# Patient Record
Sex: MALE | URBAN - METROPOLITAN AREA
[De-identification: names, ages, dates, MRNs, and addresses within clinical notes are randomized per-mention and may not be internally consistent; named-entity substitution may affect disease eponyms.]

---

## 2017-12-11 PROBLEM — E66.01 SEVERE OBESITY (HCC): Status: ACTIVE | Noted: 2017-12-11

## 2017-12-11 PROBLEM — I10 ESSENTIAL HYPERTENSION: Status: ACTIVE | Noted: 2017-12-11

## 2017-12-11 PROBLEM — J30.9 CHRONIC ALLERGIC RHINITIS: Status: ACTIVE | Noted: 2017-12-11

## 2017-12-11 PROBLEM — E78.00 HYPERCHOLESTEROLEMIA: Status: ACTIVE | Noted: 2017-12-11

## 2017-12-11 PROBLEM — M15.9 PRIMARY OSTEOARTHRITIS INVOLVING MULTIPLE JOINTS: Status: ACTIVE | Noted: 2017-12-11

## 2017-12-13 ENCOUNTER — APPOINTMENT (RX ONLY)
Dept: URBAN - METROPOLITAN AREA CLINIC 23 | Facility: CLINIC | Age: 72
Setting detail: DERMATOLOGY
End: 2017-12-13

## 2017-12-13 DIAGNOSIS — D22 MELANOCYTIC NEVI: ICD-10-CM

## 2017-12-13 DIAGNOSIS — L57.0 ACTINIC KERATOSIS: ICD-10-CM

## 2017-12-13 DIAGNOSIS — L30.4 ERYTHEMA INTERTRIGO: ICD-10-CM

## 2017-12-13 DIAGNOSIS — L81.4 OTHER MELANIN HYPERPIGMENTATION: ICD-10-CM

## 2017-12-13 DIAGNOSIS — L82.1 OTHER SEBORRHEIC KERATOSIS: ICD-10-CM

## 2017-12-13 PROBLEM — D22.5 MELANOCYTIC NEVI OF TRUNK: Status: ACTIVE | Noted: 2017-12-13

## 2017-12-13 PROBLEM — L85.3 XEROSIS CUTIS: Status: ACTIVE | Noted: 2017-12-13

## 2017-12-13 PROBLEM — J30.1 ALLERGIC RHINITIS DUE TO POLLEN: Status: ACTIVE | Noted: 2017-12-13

## 2017-12-13 PROCEDURE — ? PRESCRIPTION

## 2017-12-13 PROCEDURE — ? COUNSELING

## 2017-12-13 PROCEDURE — 11301 SHAVE SKIN LESION 0.6-1.0 CM: CPT | Mod: 59

## 2017-12-13 PROCEDURE — 17000 DESTRUCT PREMALG LESION: CPT

## 2017-12-13 PROCEDURE — 17003 DESTRUCT PREMALG LES 2-14: CPT

## 2017-12-13 PROCEDURE — ? LIQUID NITROGEN

## 2017-12-13 PROCEDURE — 99203 OFFICE O/P NEW LOW 30 MIN: CPT | Mod: 25

## 2017-12-13 PROCEDURE — ? SHAVE REMOVAL

## 2017-12-13 RX ORDER — CICLOPIROX 7.7 MG/G
GEL TOPICAL
Qty: 1 | Refills: 2 | Status: ERX | COMMUNITY
Start: 2017-12-13

## 2017-12-13 RX ADMIN — CICLOPIROX: 7.7 GEL TOPICAL at 00:00

## 2017-12-13 ASSESSMENT — LOCATION ZONE DERM
LOCATION ZONE: ARM
LOCATION ZONE: FACE
LOCATION ZONE: LEG
LOCATION ZONE: TRUNK
LOCATION ZONE: EAR

## 2017-12-13 ASSESSMENT — LOCATION DETAILED DESCRIPTION DERM
LOCATION DETAILED: RIGHT MID-UPPER BACK
LOCATION DETAILED: LEFT SUPERIOR HELIX
LOCATION DETAILED: RIGHT SUPERIOR UPPER BACK
LOCATION DETAILED: LEFT DISTAL POSTERIOR THIGH
LOCATION DETAILED: LEFT LATERAL INFERIOR CHEST
LOCATION DETAILED: LEFT CENTRAL TEMPLE
LOCATION DETAILED: RIGHT DISTAL DORSAL FOREARM
LOCATION DETAILED: LEFT DISTAL DORSAL FOREARM
LOCATION DETAILED: LEFT MEDIAL INFERIOR CHEST

## 2017-12-13 ASSESSMENT — LOCATION SIMPLE DESCRIPTION DERM
LOCATION SIMPLE: LEFT FOREARM
LOCATION SIMPLE: LEFT POSTERIOR THIGH
LOCATION SIMPLE: LEFT EAR
LOCATION SIMPLE: LEFT TEMPLE
LOCATION SIMPLE: RIGHT UPPER BACK
LOCATION SIMPLE: CHEST
LOCATION SIMPLE: RIGHT FOREARM

## 2017-12-13 NOTE — PROCEDURE: SHAVE REMOVAL
Bill 38595 For Specimen Handling/Conveyance To Laboratory?: no
Anesthesia Type: 1% lidocaine with epinephrine
Size Of Lesion In Cm (Required): 0.6
Medical Necessity Clause: This procedure was medically necessary because the lesion that was treated was:
Path Notes (To The Dermatopathologist): Please check margins.
X Size Of Lesion In Cm (Optional): 0
Consent was obtained from the patient. The risks and benefits to therapy were discussed in detail. Specifically, the risks of infection, scarring, bleeding, prolonged wound healing, incomplete removal, allergy to anesthesia, nerve injury and recurrence were addressed. Prior to the procedure, the treatment site was clearly identified and confirmed by the patient. All components of Universal Protocol/PAUSE Rule completed.
Wound Care: Vaseline
Detail Level: Detailed
Accession #: [id number]
Notification Instructions: Patient will be notified of biopsy results. However, patient instructed to call the office if not contacted within 2 weeks.
Post-Care Instructions: I reviewed with the patient in detail post-care instructions. Patient is to keep the biopsy site dry overnight, and then apply Vaseline and bandaid daily until healed. Patient may apply diluted hydrogen peroxide soaks to remove any crusting.
Anesthesia Volume In Cc: 0.3
Medical Necessity Information: It is in your best interest to select a reason for this procedure from the list below. All of these items fulfill various CMS LCD requirements except the new and changing color options.
Billing Type: Third-Party Bill
Size Of Margin In Cm (Margins Are Not Added To Billing Dimensions): -
Biopsy Method: Dermablade
Hemostasis: Aluminum Chloride

## 2018-01-12 ENCOUNTER — APPOINTMENT (RX ONLY)
Dept: URBAN - METROPOLITAN AREA CLINIC 23 | Facility: CLINIC | Age: 73
Setting detail: DERMATOLOGY
End: 2018-01-12

## 2018-01-12 DIAGNOSIS — D22 MELANOCYTIC NEVI: ICD-10-CM

## 2018-01-12 PROBLEM — L55.1 SUNBURN OF SECOND DEGREE: Status: ACTIVE | Noted: 2018-01-12

## 2018-01-12 PROBLEM — D22.5 MELANOCYTIC NEVI OF TRUNK: Status: ACTIVE | Noted: 2018-01-12

## 2018-01-12 PROCEDURE — ? EXCISION

## 2018-01-12 PROCEDURE — 12032 INTMD RPR S/A/T/EXT 2.6-7.5: CPT

## 2018-01-12 PROCEDURE — 11402 EXC TR-EXT B9+MARG 1.1-2 CM: CPT

## 2018-01-12 ASSESSMENT — LOCATION SIMPLE DESCRIPTION DERM: LOCATION SIMPLE: RIGHT UPPER BACK

## 2018-01-12 ASSESSMENT — LOCATION DETAILED DESCRIPTION DERM: LOCATION DETAILED: RIGHT SUPERIOR UPPER BACK

## 2018-01-12 ASSESSMENT — LOCATION ZONE DERM: LOCATION ZONE: TRUNK

## 2018-01-12 NOTE — PROCEDURE: EXCISION
Mastoid Interpolation Flap Text: A decision was made to reconstruct the defect utilizing an interpolation axial flap and a staged reconstruction.  A telfa template was made of the defect.  This telfa template was then used to outline the mastoid interpolation flap.  The donor area for the pedicle flap was then injected with anesthesia.  The flap was excised through the skin and subcutaneous tissue down to the layer of the underlying musculature.  The pedicle flap was carefully excised within this deep plane to maintain its blood supply.  The edges of the donor site were undermined.   The donor site was closed in a primary fashion.  The pedicle was then rotated into position and sutured.  Once the tube was sutured into place, adequate blood supply was confirmed with blanching and refill.  The pedicle was then wrapped with xeroform gauze and dressed appropriately with a telfa and gauze bandage to ensure continued blood supply and protect the attached pedicle.
Advancement Flap (Double) Text: The defect edges were debeveled with a #15 scalpel blade.  Given the location of the defect and the proximity to free margins a double advancement flap was deemed most appropriate.  Using a sterile surgical marker, the appropriate advancement flaps were drawn incorporating the defect and placing the expected incisions within the relaxed skin tension lines where possible.    The area thus outlined was incised deep to adipose tissue with a #15 scalpel blade.  The skin margins were undermined to an appropriate distance in all directions utilizing iris scissors.
Xenograft Text: The defect edges were debeveled with a #15 scalpel blade.  Given the location of the defect, shape of the defect and the proximity to free margins a xenograft was deemed most appropriate.  The graft was then trimmed to fit the size of the defect.  The graft was then placed in the primary defect and oriented appropriately.
A-T Advancement Flap Text: The defect edges were debeveled with a #15 scalpel blade.  Given the location of the defect, shape of the defect and the proximity to free margins an A-T advancement flap was deemed most appropriate.  Using a sterile surgical marker, an appropriate advancement flap was drawn incorporating the defect and placing the expected incisions within the relaxed skin tension lines where possible.    The area thus outlined was incised deep to adipose tissue with a #15 scalpel blade.  The skin margins were undermined to an appropriate distance in all directions utilizing iris scissors.
Hatchet Flap Text: The defect edges were debeveled with a #15 scalpel blade.  Given the location of the defect, shape of the defect and the proximity to free margins a hatchet flap was deemed most appropriate.  Using a sterile surgical marker, an appropriate hatchet flap was drawn incorporating the defect and placing the expected incisions within the relaxed skin tension lines where possible.    The area thus outlined was incised deep to adipose tissue with a #15 scalpel blade.  The skin margins were undermined to an appropriate distance in all directions utilizing iris scissors.
Render The Repair Note As A Separate Paragraph: No
Bilobed Flap Text: The defect edges were debeveled with a #15 scalpel blade.  Given the location of the defect and the proximity to free margins a bilobe flap was deemed most appropriate.  Using a sterile surgical marker, an appropriate bilobe flap drawn around the defect.    The area thus outlined was incised deep to adipose tissue with a #15 scalpel blade.  The skin margins were undermined to an appropriate distance in all directions utilizing iris scissors.
Size Of Margin In Cm: 0.3
Epidermal Autograft Text: The defect edges were debeveled with a #15 scalpel blade.  Given the location of the defect, shape of the defect and the proximity to free margins an epidermal autograft was deemed most appropriate.  Using a sterile surgical marker, the primary defect shape was transferred to the donor site. The epidermal graft was then harvested.  The skin graft was then placed in the primary defect and oriented appropriately.
Saucerization Excision Additional Text (Leave Blank If You Do Not Want): The margin was drawn around the clinically apparent lesion.  Incisions were then made along these lines, in a tangential fashion, to the appropriate tissue plane and the lesion was extirpated.
Posterior Auricular Interpolation Flap Text: A decision was made to reconstruct the defect utilizing an interpolation axial flap and a staged reconstruction.  A telfa template was made of the defect.  This telfa template was then used to outline the posterior auricular interpolation flap.  The donor area for the pedicle flap was then injected with anesthesia.  The flap was excised through the skin and subcutaneous tissue down to the layer of the underlying musculature.  The pedicle flap was carefully excised within this deep plane to maintain its blood supply.  The edges of the donor site were undermined.   The donor site was closed in a primary fashion.  The pedicle was then rotated into position and sutured.  Once the tube was sutured into place, adequate blood supply was confirmed with blanching and refill.  The pedicle was then wrapped with xeroform gauze and dressed appropriately with a telfa and gauze bandage to ensure continued blood supply and protect the attached pedicle.
Ear Star Wedge Flap Text: The defect edges were debeveled with a #15 blade scalpel.  Given the location of the defect and the proximity to free margins (helical rim) an ear star wedge flap was deemed most appropriate.  Using a sterile surgical marker, the appropriate flap was drawn incorporating the defect and placing the expected incisions between the helical rim and antihelix where possible.  The area thus outlined was incised through and through with a #15 scalpel blade.
Dressing: pressure dressing
Repair Performed By Another Provider Text (Leave Blank If You Do Not Want): After the tissue was excised the defect was repaired by another provider.
Show Repair Surgeon Variable: Yes
Helical Rim Advancement Flap Text: The defect edges were debeveled with a #15 blade scalpel.  Given the location of the defect and the proximity to free margins (helical rim) a double helical rim advancement flap was deemed most appropriate.  Using a sterile surgical marker, the appropriate advancement flaps were drawn incorporating the defect and placing the expected incisions between the helical rim and antihelix where possible.  The area thus outlined was incised through and through with a #15 scalpel blade.  With a skin hook and iris scissors, the flaps were gently and sharply undermined and freed up.
Double Island Pedicle Flap Text: The defect edges were debeveled with a #15 scalpel blade.  Given the location of the defect, shape of the defect and the proximity to free margins a double island pedicle advancement flap was deemed most appropriate.  Using a sterile surgical marker, an appropriate advancement flap was drawn incorporating the defect, outlining the appropriate donor tissue and placing the expected incisions within the relaxed skin tension lines where possible.    The area thus outlined was incised deep to adipose tissue with a #15 scalpel blade.  The skin margins were undermined to an appropriate distance in all directions around the primary defect and laterally outward around the island pedicle utilizing iris scissors.  There was minimal undermining beneath the pedicle flap.
Composite Graft Text: The defect edges were debeveled with a #15 scalpel blade.  Given the location of the defect, shape of the defect, the proximity to free margins and the fact the defect was full thickness a composite graft was deemed most appropriate.  The defect was outline and then transferred to the donor site.  A full thickness graft was then excised from the donor site. The graft was then placed in the primary defect, oriented appropriately and then sutured into place.  The secondary defect was then repaired using a primary closure.
V-Y Flap Text: The defect edges were debeveled with a #15 scalpel blade.  Given the location of the defect, shape of the defect and the proximity to free margins a V-Y flap was deemed most appropriate.  Using a sterile surgical marker, an appropriate advancement flap was drawn incorporating the defect and placing the expected incisions within the relaxed skin tension lines where possible.    The area thus outlined was incised deep to adipose tissue with a #15 scalpel blade.  The skin margins were undermined to an appropriate distance in all directions utilizing iris scissors.
Complex Repair And Epidermal Autograft Text: The defect edges were debeveled with a #15 scalpel blade.  The primary defect was closed partially with a complex linear closure.  Given the location of the defect, shape of the defect and the proximity to free margins an epidermal autograft was deemed most appropriate to repair the remaining defect.  The graft was trimmed to fit the size of the remaining defect.  The graft was then placed in the primary defect, oriented appropriately, and sutured into place.
Complex Repair And Double M Plasty Text: The defect edges were debeveled with a #15 scalpel blade.  The primary defect was closed partially with a complex linear closure.  Given the location of the remaining defect, shape of the defect and the proximity to free margins a double M plasty was deemed most appropriate for complete closure of the defect.  Using a sterile surgical marker, an appropriate advancement flap was drawn incorporating the defect and placing the expected incisions within the relaxed skin tension lines where possible.    The area thus outlined was incised deep to adipose tissue with a #15 scalpel blade.  The skin margins were undermined to an appropriate distance in all directions utilizing iris scissors.
V-Y Plasty Text: The defect edges were debeveled with a #15 scalpel blade.  Given the location of the defect, shape of the defect and the proximity to free margins an V-Y advancement flap was deemed most appropriate.  Using a sterile surgical marker, an appropriate advancement flap was drawn incorporating the defect and placing the expected incisions within the relaxed skin tension lines where possible.    The area thus outlined was incised deep to adipose tissue with a #15 scalpel blade.  The skin margins were undermined to an appropriate distance in all directions utilizing iris scissors.
Home Suture Removal Text: Patient was provided a home suture removal kit and will remove their sutures at home.  If they have any questions or difficulties they will call the office.
Complex Repair And Xenograft Text: The defect edges were debeveled with a #15 scalpel blade.  The primary defect was closed partially with a complex linear closure.  Given the location of the defect, shape of the defect and the proximity to free margins an tissue cultured epidermal autograft was deemed most appropriate to repair the remaining defect.  The graft was trimmed to fit the size of the remaining defect.  The graft was then placed in the primary defect, oriented appropriately, and sutured into place.
Melolabial Interpolation Flap Text: A decision was made to reconstruct the defect utilizing an interpolation axial flap and a staged reconstruction.  A telfa template was made of the defect.  This telfa template was then used to outline the melolabial interpolation flap.  The donor area for the pedicle flap was then injected with anesthesia.  The flap was excised through the skin and subcutaneous tissue down to the layer of the underlying musculature.  The pedicle flap was carefully excised within this deep plane to maintain its blood supply.  The edges of the donor site were undermined.   The donor site was closed in a primary fashion.  The pedicle was then rotated into position and sutured.  Once the tube was sutured into place, adequate blood supply was confirmed with blanching and refill.  The pedicle was then wrapped with xeroform gauze and dressed appropriately with a telfa and gauze bandage to ensure continued blood supply and protect the attached pedicle.
Primary Defect Length (In Cm): 0
Rotation Flap Text: The defect edges were debeveled with a #15 scalpel blade.  Given the location of the defect, shape of the defect and the proximity to free margins a rotation flap was deemed most appropriate.  Using a sterile surgical marker, an appropriate rotation flap was drawn incorporating the defect and placing the expected incisions within the relaxed skin tension lines where possible.    The area thus outlined was incised deep to adipose tissue with a #15 scalpel blade.  The skin margins were undermined to an appropriate distance in all directions utilizing iris scissors.
Detail Level: Detailed
Complex Repair And Rhombic Flap Text: The defect edges were debeveled with a #15 scalpel blade.  The primary defect was closed partially with a complex linear closure.  Given the location of the remaining defect, shape of the defect and the proximity to free margins a rhombic flap was deemed most appropriate for complete closure of the defect.  Using a sterile surgical marker, an appropriate advancement flap was drawn incorporating the defect and placing the expected incisions within the relaxed skin tension lines where possible.    The area thus outlined was incised deep to adipose tissue with a #15 scalpel blade.  The skin margins were undermined to an appropriate distance in all directions utilizing iris scissors.
Complex Repair Preamble Text (Leave Blank If You Do Not Want): Extensive wide undermining was performed.
Island Pedicle Flap-Requiring Vessel Identification Text: The defect edges were debeveled with a #15 scalpel blade.  Given the location of the defect, shape of the defect and the proximity to free margins an island pedicle advancement flap was deemed most appropriate.  Using a sterile surgical marker, an appropriate advancement flap was drawn, based on the axial vessel mentioned above, incorporating the defect, outlining the appropriate donor tissue and placing the expected incisions within the relaxed skin tension lines where possible.    The area thus outlined was incised deep to adipose tissue with a #15 scalpel blade.  The skin margins were undermined to an appropriate distance in all directions around the primary defect and laterally outward around the island pedicle utilizing iris scissors.  There was minimal undermining beneath the pedicle flap.
H Plasty Text: Given the location of the defect, shape of the defect and the proximity to free margins a H-plasty was deemed most appropriate for repair.  Using a sterile surgical marker, the appropriate advancement arms of the H-plasty were drawn incorporating the defect and placing the expected incisions within the relaxed skin tension lines where possible. The area thus outlined was incised deep to adipose tissue with a #15 scalpel blade. The skin margins were undermined to an appropriate distance in all directions utilizing iris scissors.  The opposing advancement arms were then advanced into place in opposite direction and anchored with interrupted buried subcutaneous sutures.
O-Z Plasty Text: The defect edges were debeveled with a #15 scalpel blade.  Given the location of the defect, shape of the defect and the proximity to free margins an O-Z plasty (double transposition flap) was deemed most appropriate.  Using a sterile surgical marker, the appropriate transposition flaps were drawn incorporating the defect and placing the expected incisions within the relaxed skin tension lines where possible.    The area thus outlined was incised deep to adipose tissue with a #15 scalpel blade.  The skin margins were undermined to an appropriate distance in all directions utilizing iris scissors.  Hemostasis was achieved with electrocautery.  The flaps were then transposed into place, one clockwise and the other counterclockwise, and anchored with interrupted buried subcutaneous sutures.
Cheek-To-Nose Interpolation Flap Text: A decision was made to reconstruct the defect utilizing an interpolation axial flap and a staged reconstruction.  A telfa template was made of the defect.  This telfa template was then used to outline the Cheek-To-Nose Interpolation flap.  The donor area for the pedicle flap was then injected with anesthesia.  The flap was excised through the skin and subcutaneous tissue down to the layer of the underlying musculature.  The interpolation flap was carefully excised within this deep plane to maintain its blood supply.  The edges of the donor site were undermined.   The donor site was closed in a primary fashion.  The pedicle was then rotated into position and sutured.  Once the tube was sutured into place, adequate blood supply was confirmed with blanching and refill.  The pedicle was then wrapped with xeroform gauze and dressed appropriately with a telfa and gauze bandage to ensure continued blood supply and protect the attached pedicle.
S Plasty Text: Given the location and shape of the defect, and the orientation of relaxed skin tension lines, an S-plasty was deemed most appropriate for repair.  Using a sterile surgical marker, the appropriate outline of the S-plasty was drawn, incorporating the defect and placing the expected incisions within the relaxed skin tension lines where possible.  The area thus outlined was incised deep to adipose tissue with a #15 scalpel blade.  The skin margins were undermined to an appropriate distance in all directions utilizing iris scissors. The skin flaps were advanced over the defect.  The opposing margins were then approximated with interrupted buried subcutaneous sutures.
Skin Substitute Text: The defect edges were debeveled with a #15 scalpel blade.  Given the location of the defect, shape of the defect and the proximity to free margins a skin substitute graft was deemed most appropriate.  The graft material was trimmed to fit the size of the defect. The graft was then placed in the primary defect and oriented appropriately.
Excision Depth: adipose tissue
Epidermal Closure: running
Transposition Flap Text: The defect edges were debeveled with a #15 scalpel blade.  Given the location of the defect and the proximity to free margins a transposition flap was deemed most appropriate.  Using a sterile surgical marker, an appropriate transposition flap was drawn incorporating the defect.    The area thus outlined was incised deep to adipose tissue with a #15 scalpel blade.  The skin margins were undermined to an appropriate distance in all directions utilizing iris scissors.
Consent was obtained from the patient. The risks and benefits to therapy were discussed in detail. Specifically, the risks of infection, scarring, bleeding, prolonged wound healing, incomplete removal, allergy to anesthesia, nerve injury and recurrence were addressed. Prior to the procedure, the treatment site was clearly identified and confirmed by the patient.
Alar Island Pedicle Flap Text: The defect edges were debeveled with a #15 scalpel blade.  Given the location of the defect, shape of the defect and the proximity to the alar rim an island pedicle advancement flap was deemed most appropriate.  Using a sterile surgical marker, an appropriate advancement flap was drawn incorporating the defect, outlining the appropriate donor tissue and placing the expected incisions within the nasal ala running parallel to the alar rim. The area thus outlined was incised with a #15 scalpel blade.  The skin margins were undermined minimally to an appropriate distance in all directions around the primary defect and laterally outward around the island pedicle utilizing iris scissors.  There was minimal undermining beneath the pedicle flap.
Trilobed Flap Text: The defect edges were debeveled with a #15 scalpel blade.  Given the location of the defect and the proximity to free margins a trilobed flap was deemed most appropriate.  Using a sterile surgical marker, an appropriate trilobed flap drawn around the defect.    The area thus outlined was incised deep to adipose tissue with a #15 scalpel blade.  The skin margins were undermined to an appropriate distance in all directions utilizing iris scissors.
Bilobed Transposition Flap Text: The defect edges were debeveled with a #15 scalpel blade.  Given the location of the defect and the proximity to free margins a bilobed transposition flap was deemed most appropriate.  Using a sterile surgical marker, an appropriate bilobe flap drawn around the defect.    The area thus outlined was incised deep to adipose tissue with a #15 scalpel blade.  The skin margins were undermined to an appropriate distance in all directions utilizing iris scissors.
Complex Repair And Single Advancement Flap Text: The defect edges were debeveled with a #15 scalpel blade.  The primary defect was closed partially with a complex linear closure.  Given the location of the remaining defect, shape of the defect and the proximity to free margins a single advancement flap was deemed most appropriate for complete closure of the defect.  Using a sterile surgical marker, an appropriate advancement flap was drawn incorporating the defect and placing the expected incisions within the relaxed skin tension lines where possible.    The area thus outlined was incised deep to adipose tissue with a #15 scalpel blade.  The skin margins were undermined to an appropriate distance in all directions utilizing iris scissors.
Bi-Rhombic Flap Text: The defect edges were debeveled with a #15 scalpel blade.  Given the location of the defect and the proximity to free margins a bi-rhombic flap was deemed most appropriate.  Using a sterile surgical marker, an appropriate rhombic flap was drawn incorporating the defect. The area thus outlined was incised deep to adipose tissue with a #15 scalpel blade.  The skin margins were undermined to an appropriate distance in all directions utilizing iris scissors.
Epidermal Sutures: 4-0 Prolene
Melolabial Transposition Flap Text: The defect edges were debeveled with a #15 scalpel blade.  Given the location of the defect and the proximity to free margins a melolabial flap was deemed most appropriate.  Using a sterile surgical marker, an appropriate melolabial transposition flap was drawn incorporating the defect.    The area thus outlined was incised deep to adipose tissue with a #15 scalpel blade.  The skin margins were undermined to an appropriate distance in all directions utilizing iris scissors.
Complex Repair And Dorsal Nasal Flap Text: The defect edges were debeveled with a #15 scalpel blade.  The primary defect was closed partially with a complex linear closure.  Given the location of the remaining defect, shape of the defect and the proximity to free margins a dorsal nasal flap was deemed most appropriate for complete closure of the defect.  Using a sterile surgical marker, an appropriate flap was drawn incorporating the defect and placing the expected incisions within the relaxed skin tension lines where possible.    The area thus outlined was incised deep to adipose tissue with a #15 scalpel blade.  The skin margins were undermined to an appropriate distance in all directions utilizing iris scissors.
Complex Repair And Modified Advancement Flap Text: The defect edges were debeveled with a #15 scalpel blade.  The primary defect was closed partially with a complex linear closure.  Given the location of the remaining defect, shape of the defect and the proximity to free margins a modified advancement flap was deemed most appropriate for complete closure of the defect.  Using a sterile surgical marker, an appropriate advancement flap was drawn incorporating the defect and placing the expected incisions within the relaxed skin tension lines where possible.    The area thus outlined was incised deep to adipose tissue with a #15 scalpel blade.  The skin margins were undermined to an appropriate distance in all directions utilizing iris scissors.
Wound Care: Vaseline
Path Notes (To The Dermatopathologist): Please check margins
Complex Repair And O-L Flap Text: The defect edges were debeveled with a #15 scalpel blade.  The primary defect was closed partially with a complex linear closure.  Given the location of the remaining defect, shape of the defect and the proximity to free margins an O-L flap was deemed most appropriate for complete closure of the defect.  Using a sterile surgical marker, an appropriate flap was drawn incorporating the defect and placing the expected incisions within the relaxed skin tension lines where possible.    The area thus outlined was incised deep to adipose tissue with a #15 scalpel blade.  The skin margins were undermined to an appropriate distance in all directions utilizing iris scissors.
Complex Repair And V-Y Plasty Text: The defect edges were debeveled with a #15 scalpel blade.  The primary defect was closed partially with a complex linear closure.  Given the location of the remaining defect, shape of the defect and the proximity to free margins a V-Y plasty was deemed most appropriate for complete closure of the defect.  Using a sterile surgical marker, an appropriate advancement flap was drawn incorporating the defect and placing the expected incisions within the relaxed skin tension lines where possible.    The area thus outlined was incised deep to adipose tissue with a #15 scalpel blade.  The skin margins were undermined to an appropriate distance in all directions utilizing iris scissors.
Graft Donor Site Bandage (Optional-Leave Blank If You Don't Want In Note): Steri-strips and a pressure bandage were applied to the donor site.
Complex Repair And Dermal Autograft Text: The defect edges were debeveled with a #15 scalpel blade.  The primary defect was closed partially with a complex linear closure.  Given the location of the defect, shape of the defect and the proximity to free margins an dermal autograft was deemed most appropriate to repair the remaining defect.  The graft was trimmed to fit the size of the remaining defect.  The graft was then placed in the primary defect, oriented appropriately, and sutured into place.
Deep Sutures: 4-0 Vicryl
Cartilage Graft Text: The defect edges were debeveled with a #15 scalpel blade.  Given the location of the defect, shape of the defect, the fact the defect involved a full thickness cartilage defect a cartilage graft was deemed most appropriate.  An appropriate donor site was identified, cleansed, and anesthetized. The cartilage graft was then harvested and transferred to the recipient site, oriented appropriately and then sutured into place.  The secondary defect was then repaired using a primary closure.
Burow's Advancement Flap Text: The defect edges were debeveled with a #15 scalpel blade.  Given the location of the defect and the proximity to free margins a Burow's advancement flap was deemed most appropriate.  Using a sterile surgical marker, the appropriate advancement flap was drawn incorporating the defect and placing the expected incisions within the relaxed skin tension lines where possible.    The area thus outlined was incised deep to adipose tissue with a #15 scalpel blade.  The skin margins were undermined to an appropriate distance in all directions utilizing iris scissors.
Medical Necessity Information: It is in your best interest to select a reason for this procedure from the list below. All of these items fulfill various CMS LCD requirements except lesion extends to a margin.
Anesthesia Volume In Cc: 5
Advancement Flap (Single) Text: The defect edges were debeveled with a #15 scalpel blade.  Given the location of the defect and the proximity to free margins a single advancement flap was deemed most appropriate.  Using a sterile surgical marker, an appropriate advancement flap was drawn incorporating the defect and placing the expected incisions within the relaxed skin tension lines where possible.    The area thus outlined was incised deep to adipose tissue with a #15 scalpel blade.  The skin margins were undermined to an appropriate distance in all directions utilizing iris scissors.
Complex Repair And Z Plasty Text: The defect edges were debeveled with a #15 scalpel blade.  The primary defect was closed partially with a complex linear closure.  Given the location of the remaining defect, shape of the defect and the proximity to free margins a Z plasty was deemed most appropriate for complete closure of the defect.  Using a sterile surgical marker, an appropriate advancement flap was drawn incorporating the defect and placing the expected incisions within the relaxed skin tension lines where possible.    The area thus outlined was incised deep to adipose tissue with a #15 scalpel blade.  The skin margins were undermined to an appropriate distance in all directions utilizing iris scissors.
O-T Advancement Flap Text: The defect edges were debeveled with a #15 scalpel blade.  Given the location of the defect, shape of the defect and the proximity to free margins an O-T advancement flap was deemed most appropriate.  Using a sterile surgical marker, an appropriate advancement flap was drawn incorporating the defect and placing the expected incisions within the relaxed skin tension lines where possible.    The area thus outlined was incised deep to adipose tissue with a #15 scalpel blade.  The skin margins were undermined to an appropriate distance in all directions utilizing iris scissors.
Mucosal Advancement Flap Text: Given the location of the defect, shape of the defect and the proximity to free margins a mucosal advancement flap was deemed most appropriate. Incisions were made with a 15 blade scalpel in the appropriate fashion along the cutaneous vermillion border and the mucosal lip. The remaining actinically damaged mucosal tissue was excised.  The mucosal advancement flap was then elevated to the gingival sulcus with care taken to preserve the neurovascular structures and advanced into the primary defect. Care was taken to ensure that precise realignment of the vermillion border was achieved.
O-T Plasty Text: The defect edges were debeveled with a #15 scalpel blade.  Given the location of the defect, shape of the defect and the proximity to free margins an O-T plasty was deemed most appropriate.  Using a sterile surgical marker, an appropriate O-T plasty was drawn incorporating the defect and placing the expected incisions within the relaxed skin tension lines where possible.    The area thus outlined was incised deep to adipose tissue with a #15 scalpel blade.  The skin margins were undermined to an appropriate distance in all directions utilizing iris scissors.
Complex Repair And Bilobe Flap Text: The defect edges were debeveled with a #15 scalpel blade.  The primary defect was closed partially with a complex linear closure.  Given the location of the remaining defect, shape of the defect and the proximity to free margins a bilobe flap was deemed most appropriate for complete closure of the defect.  Using a sterile surgical marker, an appropriate advancement flap was drawn incorporating the defect and placing the expected incisions within the relaxed skin tension lines where possible.    The area thus outlined was incised deep to adipose tissue with a #15 scalpel blade.  The skin margins were undermined to an appropriate distance in all directions utilizing iris scissors.
Tissue Cultured Epidermal Autograft Text: The defect edges were debeveled with a #15 scalpel blade.  Given the location of the defect, shape of the defect and the proximity to free margins a tissue cultured epidermal autograft was deemed most appropriate.  The graft was then trimmed to fit the size of the defect.  The graft was then placed in the primary defect and oriented appropriately.
Z Plasty Text: The lesion was extirpated to the level of the fat with a #15 scalpel blade.  Given the location of the defect, shape of the defect and the proximity to free margins a Z-plasty was deemed most appropriate for repair.  Using a sterile surgical marker, the appropriate transposition arms of the Z-plasty were drawn incorporating the defect and placing the expected incisions within the relaxed skin tension lines where possible.    The area thus outlined was incised deep to adipose tissue with a #15 scalpel blade.  The skin margins were undermined to an appropriate distance in all directions utilizing iris scissors.  The opposing transposition arms were then transposed into place in opposite direction and anchored with interrupted buried subcutaneous sutures.
Crescentic Advancement Flap Text: The defect edges were debeveled with a #15 scalpel blade.  Given the location of the defect and the proximity to free margins a crescentic advancement flap was deemed most appropriate.  Using a sterile surgical marker, the appropriate advancement flap was drawn incorporating the defect and placing the expected incisions within the relaxed skin tension lines where possible.    The area thus outlined was incised deep to adipose tissue with a #15 scalpel blade.  The skin margins were undermined to an appropriate distance in all directions utilizing iris scissors.
Complex Repair And Skin Substitute Graft Text: The defect edges were debeveled with a #15 scalpel blade.  The primary defect was closed partially with a complex linear closure.  Given the location of the remaining defect, shape of the defect and the proximity to free margins a skin substitute graft was deemed most appropriate to repair the remaining defect.  The graft was trimmed to fit the size of the remaining defect.  The graft was then placed in the primary defect, oriented appropriately, and sutured into place.
Island Pedicle Flap Text: The defect edges were debeveled with a #15 scalpel blade.  Given the location of the defect, shape of the defect and the proximity to free margins an island pedicle advancement flap was deemed most appropriate.  Using a sterile surgical marker, an appropriate advancement flap was drawn incorporating the defect, outlining the appropriate donor tissue and placing the expected incisions within the relaxed skin tension lines where possible.    The area thus outlined was incised deep to adipose tissue with a #15 scalpel blade.  The skin margins were undermined to an appropriate distance in all directions around the primary defect and laterally outward around the island pedicle utilizing iris scissors.  There was minimal undermining beneath the pedicle flap.
Purse String (Simple) Text: Given the location of the defect and the characteristics of the surrounding skin a purse string simple closure was deemed most appropriate.  Undermining was performed circumferentially around the surgical defect.  A purse string suture was then placed and tightened.
Spiral Flap Text: The defect edges were debeveled with a #15 scalpel blade.  Given the location of the defect, shape of the defect and the proximity to free margins a spiral flap was deemed most appropriate.  Using a sterile surgical marker, an appropriate rotation flap was drawn incorporating the defect and placing the expected incisions within the relaxed skin tension lines where possible. The area thus outlined was incised deep to adipose tissue with a #15 scalpel blade.  The skin margins were undermined to an appropriate distance in all directions utilizing iris scissors.
Keystone Flap Text: The defect edges were debeveled with a #15 scalpel blade.  Given the location of the defect, shape of the defect a keystone flap was deemed most appropriate.  Using a sterile surgical marker, an appropriate keystone flap was drawn incorporating the defect, outlining the appropriate donor tissue and placing the expected incisions within the relaxed skin tension lines where possible. The area thus outlined was incised deep to adipose tissue with a #15 scalpel blade.  The skin margins were undermined to an appropriate distance in all directions around the primary defect and laterally outward around the flap utilizing iris scissors.
Excisional Biopsy Additional Text (Leave Blank If You Do Not Want): The margin was drawn around the clinically apparent lesion.  An elliptical shape was then drawn on the skin incorporating the lesion and margins.  Incisions were then made along these lines to the appropriate tissue plane and the lesion was extirpated.
Estimated Blood Loss (Cc): minimal
O-L Flap Text: The defect edges were debeveled with a #15 scalpel blade.  Given the location of the defect, shape of the defect and the proximity to free margins an O-L flap was deemed most appropriate.  Using a sterile surgical marker, an appropriate advancement flap was drawn incorporating the defect and placing the expected incisions within the relaxed skin tension lines where possible.    The area thus outlined was incised deep to adipose tissue with a #15 scalpel blade.  The skin margins were undermined to an appropriate distance in all directions utilizing iris scissors.
Complex Repair And Split-Thickness Skin Graft Text: The defect edges were debeveled with a #15 scalpel blade.  The primary defect was closed partially with a complex linear closure.  Given the location of the defect, shape of the defect and the proximity to free margins a split thickness skin graft was deemed most appropriate to repair the remaining defect.  The graft was trimmed to fit the size of the remaining defect.  The graft was then placed in the primary defect, oriented appropriately, and sutured into place.
Billing Type: Third-Party Bill
Excision Method: Elliptical
Ftsg Text: The defect edges were debeveled with a #15 scalpel blade.  Given the location of the defect, shape of the defect and the proximity to free margins a full thickness skin graft was deemed most appropriate.  Using a sterile surgical marker, the primary defect shape was transferred to the donor site. The area thus outlined was incised deep to adipose tissue with a #15 scalpel blade.  The harvested graft was then trimmed of adipose tissue until only dermis and epidermis was left.  The skin margins of the secondary defect were undermined to an appropriate distance in all directions utilizing iris scissors.  The secondary defect was closed with interrupted buried subcutaneous sutures.  The skin edges were then re-apposed with running  sutures.  The skin graft was then placed in the primary defect and oriented appropriately.
No Repair - Repaired With Adjacent Surgical Defect Text (Leave Blank If You Do Not Want): After the excision the defect was repaired concurrently with another surgical defect which was in close approximation.
Star Wedge Flap Text: The defect edges were debeveled with a #15 scalpel blade.  Given the location of the defect, shape of the defect and the proximity to free margins a star wedge flap was deemed most appropriate.  Using a sterile surgical marker, an appropriate rotation flap was drawn incorporating the defect and placing the expected incisions within the relaxed skin tension lines where possible. The area thus outlined was incised deep to adipose tissue with a #15 scalpel blade.  The skin margins were undermined to an appropriate distance in all directions utilizing iris scissors.
Split-Thickness Skin Graft Text: The defect edges were debeveled with a #15 scalpel blade.  Given the location of the defect, shape of the defect and the proximity to free margins a split thickness skin graft was deemed most appropriate.  Using a sterile surgical marker, the primary defect shape was transferred to the donor site. The split thickness graft was then harvested.  The skin graft was then placed in the primary defect and oriented appropriately.
Suture Removal: 14 days
Dermal Autograft Text: The defect edges were debeveled with a #15 scalpel blade.  Given the location of the defect, shape of the defect and the proximity to free margins a dermal autograft was deemed most appropriate.  Using a sterile surgical marker, the primary defect shape was transferred to the donor site. The area thus outlined was incised deep to adipose tissue with a #15 scalpel blade.  The harvested graft was then trimmed of adipose and epidermal tissue until only dermis was left.  The skin graft was then placed in the primary defect and oriented appropriately.
Complex Repair And A-T Advancement Flap Text: The defect edges were debeveled with a #15 scalpel blade.  The primary defect was closed partially with a complex linear closure.  Given the location of the remaining defect, shape of the defect and the proximity to free margins an A-T advancement flap was deemed most appropriate for complete closure of the defect.  Using a sterile surgical marker, an appropriate advancement flap was drawn incorporating the defect and placing the expected incisions within the relaxed skin tension lines where possible.    The area thus outlined was incised deep to adipose tissue with a #15 scalpel blade.  The skin margins were undermined to an appropriate distance in all directions utilizing iris scissors.
Paramedian Forehead Flap Text: A decision was made to reconstruct the defect utilizing an interpolation axial flap and a staged reconstruction.  A telfa template was made of the defect.  This telfa template was then used to outline the paramedian forehead pedicle flap.  The donor area for the pedicle flap was then injected with anesthesia.  The flap was excised through the skin and subcutaneous tissue down to the layer of the underlying musculature.  The pedicle flap was carefully excised within this deep plane to maintain its blood supply.  The edges of the donor site were undermined.   The donor site was closed in a primary fashion.  The pedicle was then rotated into position and sutured.  Once the tube was sutured into place, adequate blood supply was confirmed with blanching and refill.  The pedicle was then wrapped with xeroform gauze and dressed appropriately with a telfa and gauze bandage to ensure continued blood supply and protect the attached pedicle.
Anesthesia Type: 1% lidocaine with epinephrine
Complex Repair And Rotation Flap Text: The defect edges were debeveled with a #15 scalpel blade.  The primary defect was closed partially with a complex linear closure.  Given the location of the remaining defect, shape of the defect and the proximity to free margins a rotation flap was deemed most appropriate for complete closure of the defect.  Using a sterile surgical marker, an appropriate advancement flap was drawn incorporating the defect and placing the expected incisions within the relaxed skin tension lines where possible.    The area thus outlined was incised deep to adipose tissue with a #15 scalpel blade.  The skin margins were undermined to an appropriate distance in all directions utilizing iris scissors.
Epidermal Closure Graft Donor Site (Optional): simple interrupted
Complex Repair And Melolabial Flap Text: The defect edges were debeveled with a #15 scalpel blade.  The primary defect was closed partially with a complex linear closure.  Given the location of the remaining defect, shape of the defect and the proximity to free margins a melolabial flap was deemed most appropriate for complete closure of the defect.  Using a sterile surgical marker, an appropriate advancement flap was drawn incorporating the defect and placing the expected incisions within the relaxed skin tension lines where possible.    The area thus outlined was incised deep to adipose tissue with a #15 scalpel blade.  The skin margins were undermined to an appropriate distance in all directions utilizing iris scissors.
Complex Repair And O-T Advancement Flap Text: The defect edges were debeveled with a #15 scalpel blade.  The primary defect was closed partially with a complex linear closure.  Given the location of the remaining defect, shape of the defect and the proximity to free margins an O-T advancement flap was deemed most appropriate for complete closure of the defect.  Using a sterile surgical marker, an appropriate advancement flap was drawn incorporating the defect and placing the expected incisions within the relaxed skin tension lines where possible.    The area thus outlined was incised deep to adipose tissue with a #15 scalpel blade.  The skin margins were undermined to an appropriate distance in all directions utilizing iris scissors.
Island Pedicle Flap With Canthal Suspension Text: The defect edges were debeveled with a #15 scalpel blade.  Given the location of the defect, shape of the defect and the proximity to free margins an island pedicle advancement flap was deemed most appropriate.  Using a sterile surgical marker, an appropriate advancement flap was drawn incorporating the defect, outlining the appropriate donor tissue and placing the expected incisions within the relaxed skin tension lines where possible. The area thus outlined was incised deep to adipose tissue with a #15 scalpel blade.  The skin margins were undermined to an appropriate distance in all directions around the primary defect and laterally outward around the island pedicle utilizing iris scissors.  There was minimal undermining beneath the pedicle flap. A suspension suture was placed in the canthal tendon to prevent tension and prevent ectropion.
Cheek Interpolation Flap Text: A decision was made to reconstruct the defect utilizing an interpolation axial flap and a staged reconstruction.  A telfa template was made of the defect.  This telfa template was then used to outline the Cheek Interpolation flap.  The donor area for the pedicle flap was then injected with anesthesia.  The flap was excised through the skin and subcutaneous tissue down to the layer of the underlying musculature.  The interpolation flap was carefully excised within this deep plane to maintain its blood supply.  The edges of the donor site were undermined.   The donor site was closed in a primary fashion.  The pedicle was then rotated into position and sutured.  Once the tube was sutured into place, adequate blood supply was confirmed with blanching and refill.  The pedicle was then wrapped with xeroform gauze and dressed appropriately with a telfa and gauze bandage to ensure continued blood supply and protect the attached pedicle.
Intermediate Repair Preamble Text (Leave Blank If You Do Not Want): Undermining was performed with blunt dissection.
Muscle Hinge Flap Text: The defect edges were debeveled with a #15 scalpel blade.  Given the size, depth and location of the defect and the proximity to free margins a muscle hinge flap was deemed most appropriate.  Using a sterile surgical marker, an appropriate hinge flap was drawn incorporating the defect. The area thus outlined was incised with a #15 scalpel blade.  The skin margins were undermined to an appropriate distance in all directions utilizing iris scissors.
Complex Repair And Ftsg Text: The defect edges were debeveled with a #15 scalpel blade.  The primary defect was closed partially with a complex linear closure.  Given the location of the defect, shape of the defect and the proximity to free margins a full thickness skin graft was deemed most appropriate to repair the remaining defect.  The graft was trimmed to fit the size of the remaining defect.  The graft was then placed in the primary defect, oriented appropriately, and sutured into place.
Slit Excision Additional Text (Leave Blank If You Do Not Want): A linear line was drawn on the skin overlying the lesion. An incision was made slowly until the lesion was visualized.  Once visualized, the lesion was removed with blunt dissection.
Purse String (Intermediate) Text: Given the location of the defect and the characteristics of the surrounding skin a pursestring intermediate closure was deemed most appropriate.  Undermining was performed circumfirentially around the surgical defect.  A purstring suture was then placed and tightened.
Hemostasis: Electrocautery
Complex Repair And W Plasty Text: The defect edges were debeveled with a #15 scalpel blade.  The primary defect was closed partially with a complex linear closure.  Given the location of the remaining defect, shape of the defect and the proximity to free margins a W plasty was deemed most appropriate for complete closure of the defect.  Using a sterile surgical marker, an appropriate advancement flap was drawn incorporating the defect and placing the expected incisions within the relaxed skin tension lines where possible.    The area thus outlined was incised deep to adipose tissue with a #15 scalpel blade.  The skin margins were undermined to an appropriate distance in all directions utilizing iris scissors.
Dorsal Nasal Flap Text: The defect edges were debeveled with a #15 scalpel blade.  Given the location of the defect and the proximity to free margins a dorsal nasal flap was deemed most appropriate.  Using a sterile surgical marker, an appropriate dorsal nasal flap was drawn around the defect.    The area thus outlined was incised deep to adipose tissue with a #15 scalpel blade.  The skin margins were undermined to an appropriate distance in all directions utilizing iris scissors.
Size Of Lesion In Cm: 0.8
Modified Advancement Flap Text: The defect edges were debeveled with a #15 scalpel blade.  Given the location of the defect, shape of the defect and the proximity to free margins a modified advancement flap was deemed most appropriate.  Using a sterile surgical marker, an appropriate advancement flap was drawn incorporating the defect and placing the expected incisions within the relaxed skin tension lines where possible.    The area thus outlined was incised deep to adipose tissue with a #15 scalpel blade.  The skin margins were undermined to an appropriate distance in all directions utilizing iris scissors.
Perilesional Excision Additional Text (Leave Blank If You Do Not Want): The margin was drawn around the clinically apparent lesion. Incisions were then made along these lines to the appropriate tissue plane and the lesion was extirpated.
Intermediate / Complex Repair - Final Wound Length In Cm: 4.5
Complex Repair And Double Advancement Flap Text: The defect edges were debeveled with a #15 scalpel blade.  The primary defect was closed partially with a complex linear closure.  Given the location of the remaining defect, shape of the defect and the proximity to free margins a double advancement flap was deemed most appropriate for complete closure of the defect.  Using a sterile surgical marker, an appropriate advancement flap was drawn incorporating the defect and placing the expected incisions within the relaxed skin tension lines where possible.    The area thus outlined was incised deep to adipose tissue with a #15 scalpel blade.  The skin margins were undermined to an appropriate distance in all directions utilizing iris scissors.
Interpolation Flap Text: A decision was made to reconstruct the defect utilizing an interpolation axial flap and a staged reconstruction.  A telfa template was made of the defect.  This telfa template was then used to outline the interpolation flap.  The donor area for the pedicle flap was then injected with anesthesia.  The flap was excised through the skin and subcutaneous tissue down to the layer of the underlying musculature.  The interpolation flap was carefully excised within this deep plane to maintain its blood supply.  The edges of the donor site were undermined.   The donor site was closed in a primary fashion.  The pedicle was then rotated into position and sutured.  Once the tube was sutured into place, adequate blood supply was confirmed with blanching and refill.  The pedicle was then wrapped with xeroform gauze and dressed appropriately with a telfa and gauze bandage to ensure continued blood supply and protect the attached pedicle.
Complex Repair And M Plasty Text: The defect edges were debeveled with a #15 scalpel blade.  The primary defect was closed partially with a complex linear closure.  Given the location of the remaining defect, shape of the defect and the proximity to free margins an M plasty was deemed most appropriate for complete closure of the defect.  Using a sterile surgical marker, an appropriate advancement flap was drawn incorporating the defect and placing the expected incisions within the relaxed skin tension lines where possible.    The area thus outlined was incised deep to adipose tissue with a #15 scalpel blade.  The skin margins were undermined to an appropriate distance in all directions utilizing iris scissors.
Rhombic Flap Text: The defect edges were debeveled with a #15 scalpel blade.  Given the location of the defect and the proximity to free margins a rhombic flap was deemed most appropriate.  Using a sterile surgical marker, an appropriate rhombic flap was drawn incorporating the defect.    The area thus outlined was incised deep to adipose tissue with a #15 scalpel blade.  The skin margins were undermined to an appropriate distance in all directions utilizing iris scissors.
Bilateral Helical Rim Advancement Flap Text: The defect edges were debeveled with a #15 blade scalpel.  Given the location of the defect and the proximity to free margins (helical rim) a bilateral helical rim advancement flap was deemed most appropriate.  Using a sterile surgical marker, the appropriate advancement flaps were drawn incorporating the defect and placing the expected incisions between the helical rim and antihelix where possible.  The area thus outlined was incised through and through with a #15 scalpel blade.  With a skin hook and iris scissors, the flaps were gently and sharply undermined and freed up.
Medical Necessity Clause: This procedure was medically necessary because the lesion that was treated was:
Scalpel Size: 15 blade
Accession #: PC
W Plasty Text: The lesion was extirpated to the level of the fat with a #15 scalpel blade.  Given the location of the defect, shape of the defect and the proximity to free margins a W-plasty was deemed most appropriate for repair.  Using a sterile surgical marker, the appropriate transposition arms of the W-plasty were drawn incorporating the defect and placing the expected incisions within the relaxed skin tension lines where possible.    The area thus outlined was incised deep to adipose tissue with a #15 scalpel blade.  The skin margins were undermined to an appropriate distance in all directions utilizing iris scissors.  The opposing transposition arms were then transposed into place in opposite direction and anchored with interrupted buried subcutaneous sutures.
Fusiform Excision Additional Text (Leave Blank If You Do Not Want): The margin was drawn around the clinically apparent lesion.  A fusiform shape was then drawn on the skin incorporating the lesion and margins.  Incisions were then made along these lines to the appropriate tissue plane and the lesion was extirpated.
Lip Wedge Excision Repair Text: Given the location of the defect and the proximity to free margins a full thickness wedge repair was deemed most appropriate.  Using a sterile surgical marker, the appropriate repair was drawn incorporating the defect and placing the expected incisions perpendicular to the vermillion border.  The vermillion border was also meticulously outlined to ensure appropriate reapproximation during the repair.  The area thus outlined was incised through and through with a #15 scalpel blade.  The muscularis and dermis were reaproximated with deep sutures following hemostasis. Care was taken to realign the vermillion border before proceeding with the superficial closure.  Once the vermillion was realigned the superfical and mucosal closure was finished.
Post-Care Instructions: I reviewed with the patient in detail post-care instructions. Patient is not to engage in any heavy lifting, exercise, or swimming for the next 14 days. Should the patient develop any fevers, chills, bleeding, severe pain patient will contact the office immediately.
Repair Type: Intermediate
Complex Repair And Transposition Flap Text: The defect edges were debeveled with a #15 scalpel blade.  The primary defect was closed partially with a complex linear closure.  Given the location of the remaining defect, shape of the defect and the proximity to free margins a transposition flap was deemed most appropriate for complete closure of the defect.  Using a sterile surgical marker, an appropriate advancement flap was drawn incorporating the defect and placing the expected incisions within the relaxed skin tension lines where possible.    The area thus outlined was incised deep to adipose tissue with a #15 scalpel blade.  The skin margins were undermined to an appropriate distance in all directions utilizing iris scissors.

## 2018-01-26 ENCOUNTER — APPOINTMENT (RX ONLY)
Dept: URBAN - METROPOLITAN AREA CLINIC 23 | Facility: CLINIC | Age: 73
Setting detail: DERMATOLOGY
End: 2018-01-26

## 2018-01-26 DIAGNOSIS — Z48.01 ENCOUNTER FOR CHANGE OR REMOVAL OF SURGICAL WOUND DRESSING: ICD-10-CM

## 2018-01-26 PROBLEM — L29.8 OTHER PRURITUS: Status: ACTIVE | Noted: 2018-01-26

## 2018-01-26 PROBLEM — L70.0 ACNE VULGARIS: Status: ACTIVE | Noted: 2018-01-26

## 2018-01-26 PROCEDURE — ? SUTURE REMOVAL (GLOBAL PERIOD)

## 2018-01-26 ASSESSMENT — LOCATION SIMPLE DESCRIPTION DERM: LOCATION SIMPLE: RIGHT UPPER BACK

## 2018-01-26 ASSESSMENT — LOCATION DETAILED DESCRIPTION DERM: LOCATION DETAILED: RIGHT SUPERIOR UPPER BACK

## 2018-01-26 ASSESSMENT — LOCATION ZONE DERM: LOCATION ZONE: TRUNK

## 2018-01-26 NOTE — PROCEDURE: SUTURE REMOVAL (GLOBAL PERIOD)
Detail Level: Simple
Add 85208 Cpt? (Important Note: In 2017 The Use Of 14189 Is Being Tracked By Cms To Determine Future Global Period Reimbursement For Global Periods): no

## 2018-09-19 ENCOUNTER — APPOINTMENT (RX ONLY)
Dept: URBAN - METROPOLITAN AREA CLINIC 23 | Facility: CLINIC | Age: 73
Setting detail: DERMATOLOGY
End: 2018-09-19

## 2018-09-19 DIAGNOSIS — D22 MELANOCYTIC NEVI: ICD-10-CM

## 2018-09-19 DIAGNOSIS — L57.0 ACTINIC KERATOSIS: ICD-10-CM

## 2018-09-19 DIAGNOSIS — Z87.2 PERSONAL HISTORY OF DISEASES OF THE SKIN AND SUBCUTANEOUS TISSUE: ICD-10-CM

## 2018-09-19 DIAGNOSIS — L21.8 OTHER SEBORRHEIC DERMATITIS: ICD-10-CM

## 2018-09-19 PROBLEM — E78.5 HYPERLIPIDEMIA, UNSPECIFIED: Status: ACTIVE | Noted: 2018-09-19

## 2018-09-19 PROBLEM — H91.90 UNSPECIFIED HEARING LOSS, UNSPECIFIED EAR: Status: ACTIVE | Noted: 2018-09-19

## 2018-09-19 PROBLEM — D22.5 MELANOCYTIC NEVI OF TRUNK: Status: ACTIVE | Noted: 2018-09-19

## 2018-09-19 PROCEDURE — 17000 DESTRUCT PREMALG LESION: CPT

## 2018-09-19 PROCEDURE — 11300 SHAVE SKIN LESION 0.5 CM/<: CPT | Mod: 59

## 2018-09-19 PROCEDURE — ? COUNSELING

## 2018-09-19 PROCEDURE — ? LIQUID NITROGEN

## 2018-09-19 PROCEDURE — ? TREATMENT REGIMEN

## 2018-09-19 PROCEDURE — 17003 DESTRUCT PREMALG LES 2-14: CPT

## 2018-09-19 PROCEDURE — 99213 OFFICE O/P EST LOW 20 MIN: CPT | Mod: 25

## 2018-09-19 PROCEDURE — ? SHAVE REMOVAL

## 2018-09-19 ASSESSMENT — LOCATION DETAILED DESCRIPTION DERM
LOCATION DETAILED: GLABELLA
LOCATION DETAILED: RIGHT CENTRAL MALAR CHEEK
LOCATION DETAILED: LEFT SUPERIOR HELIX
LOCATION DETAILED: RIGHT SUPERIOR UPPER BACK
LOCATION DETAILED: RIGHT MID-UPPER BACK
LOCATION DETAILED: LEFT LATERAL FOREHEAD
LOCATION DETAILED: LEFT LATERAL MALAR CHEEK
LOCATION DETAILED: LEFT MEDIAL FOREHEAD
LOCATION DETAILED: RIGHT SUPERIOR FOREHEAD

## 2018-09-19 ASSESSMENT — LOCATION ZONE DERM
LOCATION ZONE: TRUNK
LOCATION ZONE: FACE
LOCATION ZONE: EAR

## 2018-09-19 ASSESSMENT — LOCATION SIMPLE DESCRIPTION DERM
LOCATION SIMPLE: GLABELLA
LOCATION SIMPLE: LEFT EAR
LOCATION SIMPLE: LEFT FOREHEAD
LOCATION SIMPLE: RIGHT FOREHEAD
LOCATION SIMPLE: RIGHT CHEEK
LOCATION SIMPLE: RIGHT UPPER BACK
LOCATION SIMPLE: LEFT CHEEK

## 2018-09-19 NOTE — PROCEDURE: SHAVE REMOVAL
Accession #: PC
Notification Instructions: Patient will be notified of biopsy results. However, patient instructed to call the office if not contacted within 2 weeks.
Wound Care: Vaseline
Detail Level: Detailed
Size Of Lesion In Cm (Required): 0.3
Bill 58642 For Specimen Handling/Conveyance To Laboratory?: no
Post-Care Instructions: I reviewed with the patient in detail post-care instructions. Patient is to keep the biopsy site dry overnight, and then apply Vaseline and bandaid daily until healed. Patient may apply diluted hydrogen peroxide soaks to remove any crusting.
Biopsy Method: Dermablade
Billing Type: Third-Party Bill
Consent was obtained from the patient. The risks and benefits to therapy were discussed in detail. Specifically, the risks of infection, scarring, bleeding, prolonged wound healing, incomplete removal, allergy to anesthesia, nerve injury and recurrence were addressed. Prior to the procedure, the treatment site was clearly identified and confirmed by the patient. All components of Universal Protocol/PAUSE Rule completed.
Path Notes (To The Dermatopathologist): Please check margins.
Medical Necessity Information: It is in your best interest to select a reason for this procedure from the list below. All of these items fulfill various CMS LCD requirements except the new and changing color options.
Anesthesia Type: 1% lidocaine with epinephrine
Hemostasis: Aluminum Chloride
Was A Bandage Applied: Yes
Medical Necessity Clause: This procedure was medically necessary because the lesion that was treated was:
X Size Of Lesion In Cm (Optional): 0

## 2019-07-22 ENCOUNTER — HOSPITAL ENCOUNTER (OUTPATIENT)
Dept: GENERAL RADIOLOGY | Age: 74
Discharge: HOME | End: 2019-07-22
Payer: MEDICARE

## 2019-07-22 DIAGNOSIS — G89.29 CHRONIC PAIN OF RIGHT ANKLE: ICD-10-CM

## 2019-07-22 DIAGNOSIS — M25.571 CHRONIC PAIN OF RIGHT ANKLE: ICD-10-CM

## 2019-07-22 PROCEDURE — 73610 X-RAY EXAM OF ANKLE: CPT

## 2019-09-27 ENCOUNTER — APPOINTMENT (RX ONLY)
Dept: URBAN - METROPOLITAN AREA CLINIC 23 | Facility: CLINIC | Age: 74
Setting detail: DERMATOLOGY
End: 2019-09-27

## 2019-09-27 DIAGNOSIS — L57.0 ACTINIC KERATOSIS: ICD-10-CM

## 2019-09-27 DIAGNOSIS — D22 MELANOCYTIC NEVI: ICD-10-CM

## 2019-09-27 DIAGNOSIS — Z87.2 PERSONAL HISTORY OF DISEASES OF THE SKIN AND SUBCUTANEOUS TISSUE: ICD-10-CM

## 2019-09-27 DIAGNOSIS — L81.4 OTHER MELANIN HYPERPIGMENTATION: ICD-10-CM

## 2019-09-27 PROBLEM — D22.5 MELANOCYTIC NEVI OF TRUNK: Status: ACTIVE | Noted: 2019-09-27

## 2019-09-27 PROBLEM — I10 ESSENTIAL (PRIMARY) HYPERTENSION: Status: ACTIVE | Noted: 2019-09-27

## 2019-09-27 PROBLEM — M12.9 ARTHROPATHY, UNSPECIFIED: Status: ACTIVE | Noted: 2019-09-27

## 2019-09-27 PROBLEM — D48.5 NEOPLASM OF UNCERTAIN BEHAVIOR OF SKIN: Status: ACTIVE | Noted: 2019-09-27

## 2019-09-27 PROCEDURE — ? COUNSELING

## 2019-09-27 PROCEDURE — 99213 OFFICE O/P EST LOW 20 MIN: CPT | Mod: 25

## 2019-09-27 PROCEDURE — ? OTHER

## 2019-09-27 PROCEDURE — ? LIQUID NITROGEN

## 2019-09-27 PROCEDURE — 17000 DESTRUCT PREMALG LESION: CPT

## 2019-09-27 PROCEDURE — 17003 DESTRUCT PREMALG LES 2-14: CPT

## 2019-09-27 ASSESSMENT — LOCATION DETAILED DESCRIPTION DERM
LOCATION DETAILED: RIGHT MID-UPPER BACK
LOCATION DETAILED: RIGHT INFERIOR FOREHEAD
LOCATION DETAILED: PERIUMBILICAL SKIN
LOCATION DETAILED: RIGHT DISTAL DORSAL FOREARM
LOCATION DETAILED: LEFT SUPERIOR UPPER BACK
LOCATION DETAILED: LEFT DISTAL DORSAL FOREARM
LOCATION DETAILED: LEFT MEDIAL TRAPEZIAL NECK
LOCATION DETAILED: RIGHT SUPERIOR UPPER BACK
LOCATION DETAILED: RIGHT SUPERIOR CENTRAL MALAR CHEEK

## 2019-09-27 ASSESSMENT — LOCATION ZONE DERM
LOCATION ZONE: NECK
LOCATION ZONE: ARM
LOCATION ZONE: FACE
LOCATION ZONE: TRUNK

## 2019-09-27 ASSESSMENT — LOCATION SIMPLE DESCRIPTION DERM
LOCATION SIMPLE: LEFT UPPER BACK
LOCATION SIMPLE: RIGHT CHEEK
LOCATION SIMPLE: RIGHT UPPER BACK
LOCATION SIMPLE: RIGHT FOREHEAD
LOCATION SIMPLE: ABDOMEN
LOCATION SIMPLE: RIGHT FOREARM
LOCATION SIMPLE: LEFT FOREARM
LOCATION SIMPLE: POSTERIOR NECK

## 2019-09-27 NOTE — PROCEDURE: OTHER
Other (Free Text): Pt signed a denial of treatment letter for this site due to being unable to reach this spot to clean and bandage. No evidence of pigmentation in biopsy scar at today’s visit
Detail Level: Simple
Note Text (......Xxx Chief Complaint.): This diagnosis correlates with the

## 2020-01-07 ENCOUNTER — RX ONLY (OUTPATIENT)
Age: 75
Setting detail: RX ONLY
End: 2020-01-07

## 2020-01-07 RX ORDER — CICLOPIROX 7.7 MG/G
GEL TOPICAL
Qty: 1 | Refills: 2 | Status: ERX

## 2021-10-27 ENCOUNTER — HOSPITAL ENCOUNTER (OUTPATIENT)
Dept: PHYSICAL THERAPY | Age: 76
Discharge: HOME | End: 2021-10-27
Payer: MEDICARE

## 2021-10-27 DIAGNOSIS — R26.89 LOSS OF BALANCE: ICD-10-CM

## 2021-10-27 PROCEDURE — 97161 PT EVAL LOW COMPLEX 20 MIN: CPT

## 2021-10-27 NOTE — THERAPY EVALUATION
89}  Reza Ricketts  : 1945  [place]  Secondary: [place] [facility]  [address] [facility], [place] [address] [address] [facility]  [phone]   [phone]       OUTPATIENT PHYSICAL THERAPY:Initial Assessment 10/27/2021   ICD-10: Treatment Diagnosis:   Unsteadiness on feet (R26.81)  Loss of balance (R26.89)    Precautions/Allergies:   Patient has no known allergies. TREATMENT PLAN:  Effective Dates: 10/27/2021 TO 2022 (90 days)  Frequency/Duration: 2 times a week for 90 days MEDICAL/REFERRING DIAGNOSIS:  Loss of balance [R26.89]   DATE OF ONSET: Chronic  REFERRING PHYSICIAN: Lay Tafoya MD MD Orders: Evaluate & Treat  Return MD Appointment: TBD     INITIAL ASSESSMENT: Mr. Deidra Cronin presents for physical therapy initial evaluation today with complaints of unsteadiness on his feet and loss of balance. Patient demonstrates impairments consisting of decreased hip abductor and plantarflexor strength, decreased balance, and decreased functional tolerance secondary to fear of falling, which limits his capacity for independent functional mobility. Patient presents as a fall risk considering his history of falls and deficits in musculature that play a significant role in balance reaction strategies. He presents with good strength overall and demonstrated excellence on the Tinetti outcome measure; however, he reports uncertainty in regards to reducing his risk of falls. He could benefit from therapeutic exercises, neuromuscular re-education, gait training, patient education, and a comprehensive home exercise program, to address his impairments and maximize his functional independence. PROBLEM LIST: (Impacting functional limitations)  1. Decreased Strength  2. Decreased ADL/Functional Activities  3. Decreased Transfer Abilities  4. Decreased Ambulation Ability/Technique  5. Decreased Balance  6.  Decreased Activity Tolerance  7. Decreased Angelina with Home Exercise Program INTERVENTIONS PLANNED: (Treatment may consist of any combination of the following)  1. Balance Exercise  2. Bed Mobility  3. Cold  4. Electrical Stimulation  5. Family Education  6. Gait Training  7. Heat  8. Home Exercise Program (HEP)  9. Manual Therapy  10. Neuromuscular Re-education/Strengthening  11. Range of Motion (ROM)  12. Therapeutic Activities  13. Therapeutic Exercise/Strengthening  14. Transcutaneous Electrical Nerve Stimulation (TENS)  15. Transfer Training     GOALS:    Discharge Goals: Time Frame: 6 weeks  1. Patient will demonstrate and verbalize independence with HEP. 2. Patient will demonstrate an improved score of at least 80% on the ABC Scale to indicate improvement in balance. 3. Patient will demonstrate ability to walk 1450 feet via 6MWT to demonstrate gains in endurance and activity tolerance. 4. Patient will verbalize 3 fall prevention strategies to improve safety within the home and community. 5. Patient will demonstrate ability to ambulate 500 feet on level and unlevel surfaces with LRAD independently. OUTCOME MEASURES:   Tool Used: Tinetti  Score:  Initial: 28/28 (Date: 10/27/21) Most Recent: X/28 (Date: --)   Interpretation of Score: The maximum score for the gait component is 12 points. The maximum score for the balance component is 16 points. The maximum total score is 28 points. In general, patients who score below 19 are at a high risk for falls. Patients who score in the range of 19-24 indicate that the patient has a risk for falls. Tool Used: 6-Minute Walk Test  Score:  Initial: 1300 feet (Date: 10/27/21) Most Recent: X feet (Date: --)   Interpretation of Score: Normal range varies but is approximately 6425-1452 feet. Tool Used: Activities-Specific Balance Confidence (ABC) Scale  Score:  Initial: 75% Confidence (Date: 10/27/21) Most Recent: X% Confidence (Date: --)   Interpretation of Score:  The test rates the patients percentage of confidence with functional daily activities from 0%, indicating no confidence, to 100%, indicating complete confidence. The percentages are added together and then averaged. If the average is less than 80%, this indicates significant impairment with daily activities. MEDICAL NECESSITY:   · Patient demonstrates good rehab potential due to previous functional level. REASON FOR SERVICES/OTHER COMMENTS:  · Patient continues to require skilled intervention due to decreased functional mobility. Regarding Laura Sage's therapy, I certify that the treatment plan above will be carried out by a therapist or under their direction. Thank you for this referral,    Sarika Ramirez, PT, DPT    Referring Physician Signature: Luciano Leahy MD _______________________________ Date _____________      HISTORY:   History of Injury/Illness (Reason for Referral):  Patient presents to physical therapy with complaints of unsteadiness on his feet and loss of balance. He says that he attends activities, such as [employer], at the senior center in [place] and that a PT came and visited the center who recommended physical therapy who struggled with their balance. He decided to reach out to his physician to retrieve a referral for PT. He says he has not fallen recently, but that his last incident was a few months ago. He says that he's noticed when he walks down the hallway at home that he tries to hold onto furniture or the wall for added support. He also notes that turning makes him feel unsteady. He says that he will use a cane occasionally whenever his plantar fasciitis flares up. Past Medical History/Comorbidities:   Mr. Estela Carr  has a past medical history of Arthritis, Cancer ([address] Chronic pain, GERD (gastroesophageal reflux disease), Headache, Hypercholesterolemia, and Hypertension. Mr. Estela Carr  has a past surgical history that includes hx tonsillectomy.     Social History/Living Environment:   Lives alone in a single-story home with 2 steps to enter (single porch post available at front entrance)    Prior Level of Function/Work/Activity:  Independent with functional mobility and ADLs     Ambulatory/Rehab Services H2 Model Falls Risk Assessment   Risk Factors:       (1)  Gender [Male]       (5)  History of Recent Falls [w/in 3 months] Ability to Rise from Chair:       (0)  Ability to rise in a single movement   Falls Prevention Plan:       No modifications necessary   Total: (5 or greater = High Risk): 6   ©2010 [facility] [place] [address] [place] [address] Patent #[id number]. Federal Law prohibits the replication, distribution or use without written permission from [facility] [employer]   Current Medications:       Current Outpatient Medications:     amoxicillin-clavulanate (AUGMENTIN) 875-125 mg per tablet, Take 1 Tablet by mouth every twelve (12) hours. , Disp: 14 Tablet, Rfl: 0    fluticasone propionate (Flonase) 50 mcg/actuation nasal spray, 1 [place] by Both Nostrils route daily. PRN allergy symptoms, Disp: 1 Each, Rfl: 5    hydroCHLOROthiazide (HYDRODIURIL) 12.5 mg tablet, Take 1 Tablet by mouth daily. , Disp: 90 Tablet, Rfl: 1    metoprolol succinate (TOPROL-XL) 50 mg XL tablet, Take 1 Tablet by mouth daily. , Disp: 90 Tablet, Rfl: 1    simvastatin (ZOCOR) 40 mg tablet, Take 1 Tablet by mouth nightly., Disp: 90 Tablet, Rfl: 1    losartan (COZAAR) 100 mg tablet, Take 1 Tablet by mouth daily. , Disp: 90 Tablet, Rfl: 1    diclofenac (VOLTAREN) 1 % gel, APPLY 2GRAMS TO AFFECTED AREA 2 TIMES A DAY AS NEEDED, Disp: 100 g, Rfl: 4    loteprednol etabonate (LOTEMAX) 0.5 % ophthalmic suspension, Administer 1 Drop to right eye as needed. , Disp: , Rfl:    Number of Personal Factors/Comorbidities that affect the Plan of Care: 1-2: MODERATE COMPLEXITY   EXAMINATION:   ROM:          Appears to be within normal limits    Strength:     Date: 10/27/21    RIGHT LEFT   Hip flexion 5/5 5/5   Hip extension 4+/5 4+/5   Hip abduction 3+/5 3+/5   Knee flexion 5/5 5/5   Knee extension 5/5 5/5   Ankle dorsiflexion 5/5 5/5   Ankle plantarflexion 3-/5 3-/5     Neurological Screen:        Dermatomes: no discrepancy with light touch detection    Functional Mobility:         Bed mobility: independent        Transfers: independent        Gait: decreased arm swing, decreased trunk rotation        Stairs: not tested    Balance:     Date: 10/27/21    Romberg - eyes open  30 sec    Romberg - eyes closed  30 sec    Single leg stance - RLE  9 sec    Single leg stance - LLE  19 sec        Body Structures Involved:  1. Nerves  2. Eyes and Ears  3. Bones  4. Joints  5. Muscles  6. Ligaments Body Functions Affected:  1. Mental  2. Sensory/Pain  3. Neuromusculoskeletal  4. Movement Related Activities and Participation Affected:  1. Learning and Applying Knowledge  2. General Tasks and Demands  3. Mobility  4. Self Care  5. Domestic Life  6. Community, Social and [place]   Number of elements (examined above) that affect the Plan of Care: 3: MODERATE COMPLEXITY   CLINICAL PRESENTATION:   Presentation: Stable and uncomplicated: LOW COMPLEXITY   CLINICAL DECISION MAKING:   Use of outcome tool(s) and clinical judgment create a POC that gives a: Questionable prediction of patient's progress: MODERATE COMPLEXITY           TREATMENT/SESSION ASSESSMENT: Patient verbalized understanding of role of PT and HEP handout. · Pain/ Symptoms: Initial: 0/10 Post Session: 0/10 ·   Compliance with Program/Exercises: Will assess as treatment progresses. · Recommendations/Intent for next treatment session: Next visit will focus on advancements to more challenging activities.     Total Treatment Duration:  PT Patient Time In/Time Out  Time In: 1300  Time Out: 1345    Gorge Hernandez, PT, DPT

## 2021-10-27 NOTE — PROGRESS NOTES
Cali Quiros Greer  : 1945  [place]  Secondary: [place] [facility]  [address] [facility], [place] [address] [address] [facility]  [phone]   [phone]      OUTPATIENT PHYSICAL THERAPY: Daily Treatment Note 10/27/2021  Visit Count: 1    ICD-10: Treatment Diagnosis:   Unsteadiness on feet (R26.81)  Loss of balance (R26.89)    Pre-treatment Symptoms/Complaints: See initial evaluation note from today. Pain: Initial: 0/10 Post Session: 0/10     Updated Objective Findings: See initial evaluation note from today. TREATMENT:     THERAPEUTIC EXERCISE (0 minutes): Exercises per grid below to improve mobility, strength and balance. Required minimal visual, verbal and tactile cues to promote proper body alignment, promote proper body posture and promote proper body mechanics. Progressed resistance, range and repetitions as indicated. Date:   Date:   Date:     Activity/Exercise Parameters Parameters Parameters   Patient education HEP review                                           MANUAL THERAPY (0 minutes): Joint mobilization, soft tissue mobilization, and manipulation was utilized and necessary because of the patient's restricted joint motion and restricted motion of soft tissue. MODALITIES (0 minutes): Electrical Stimulation Therapy (IFC) was provided with intensity adjusted throughout treatment to patient tolerance. Hot Pack Therapy in order to provide analgesia and relieve muscle spasm. THERAPEUTIC ACTIVITY (0 minutes): Therapeutic activities per grid below to improve mobility, strength, balance and coordination.  Required minimal visual, verbal and tactile cues to promote static and dynamic balance in stting & standing, promote coordination of bilateral UE & LE, and promote motor control of bilateral UE & LE.    NEUROMUSCULAR RE-EDUCATION (0 minutes): Exercise/activities per grid below to improve balance, coordination, posture and proprioception. Required minimal visual, verbal and tactile cues to promote static and dynamic balance in sitting & standing, promote coordination of bilateral UE & LE, and promote motor control of bilateral UE & LE.    HEP   Access Code: [email or web address]  URL: [email or web address]  Date: 10/27/2021  Prepared by: Robert Leavitt    Exercises  Supine Bridge - 1 x daily - 4 x weekly - 3 sets - 10 reps  Clamshell with Resistance - 1 x daily - 4 x weekly - 3 sets - 10 reps  Standing Heel Raise with Support - 1 x daily - 4 x weekly - 3 sets - 20 reps    CheckInPage Portal  Treatment/Session Summary:    · Response to Treatment: Patient tolerated assessment well today. · Communication/Consultation: PT emphasized the importance of performing HEP consistently. · Equipment provided: Ana julio  · Recommendations/Intent for next treatment session: Next visit will focus on progressing to more challenging activities.     Total Treatment Billable Duration: 45 minutes  PT Patient Time In/Time Out  Time In: 1300  Time Out: Juana Morton, PT, DPT    Visit Approval Visit # Therapist initials Date A NS / Cx < 24 hr >24 hr Cx Comments          RECERT DATE: 6/49/05    1 CHICA 10/27/21 [x]  [] [] Initial evaluation       [] [] []        [] [] []        [] [] []        [] [] []        [] [] []        [] [] []        [] [] []        [] [] []        [] [] []        [] [] []        [] [] []        [] [] []        [] [] []        [] [] []        [] [] []        [] [] []        [] [] []       Future Appointments   Date Time Provider Miko Awan   11/23/2021  8:40 AM Lay Tafoya MD [facility]   4/14/2022  8:00 AM Lay Tafoya MD [facility]

## 2021-11-01 ENCOUNTER — HOSPITAL ENCOUNTER (OUTPATIENT)
Dept: PHYSICAL THERAPY | Age: 76
Discharge: HOME | End: 2021-11-01
Payer: MEDICARE

## 2021-11-01 PROCEDURE — 97110 THERAPEUTIC EXERCISES: CPT

## 2021-11-01 PROCEDURE — 97112 NEUROMUSCULAR REEDUCATION: CPT

## 2021-11-01 NOTE — PROGRESS NOTES
Brenda [facility]  : 1945  [place]  Secondary: [place] [facility]  [address] [facility], [place], [facility] [address] [facility]  [phone]   [phone]      OUTPATIENT PHYSICAL THERAPY: Daily Treatment Note 2021  Visit Count: 2    ICD-10: Treatment Diagnosis:   Unsteadiness on feet (R26.81)  Loss of balance (R26.89)    Pre-treatment Symptoms/Complaints: Patient reports his shoulder has been bothering him a little bit. Says he had a good weekend. Denies any new changes since his last visit. Pain: Initial: 0/10 Post Session: 0/10     Updated Objective Findings: N/A    TREATMENT:     THERAPEUTIC EXERCISE (38 minutes): Exercises per grid below to improve mobility, strength and balance. Required minimal visual, verbal and tactile cues to promote proper body alignment, promote proper body posture and promote proper body mechanics. Progressed resistance, range and repetitions as indicated. Date:  21 Date:   Date:     Activity/Exercise Parameters Parameters Parameters   Patient education HEP review     SciFit / NuStep 6 minutes  Level 1     Bridges 1 x 10 x 3 sec hold DL  1 x 10 each leg SL     Resisted side stepping 2 x 2 laps in  bars  GTB at midfoot     Feet together - firm surface 1 x 30 sec EC     Feet tandem - firm surface 1 x 30 sec EO  1 x 30 sec EC     Feet together - foam surface 1 x 30 sec EO  2 x 30 sec EC     Feet tandem - foam surface 1 x 30 sec EO     Standing heel raises 1 x 20 B on level surface  Ball between ankles     Incline board - heel cord stretch 3 x 30 sec B  2nd rung; BUE support                               MANUAL THERAPY (0 minutes): Joint mobilization, soft tissue mobilization, and manipulation was utilized and necessary because of the patient's restricted joint motion and restricted motion of soft tissue.      MODALITIES (0 minutes): Electrical Stimulation Therapy (IFC) was provided with intensity adjusted throughout treatment to patient tolerance. Hot Pack Therapy in order to provide analgesia and relieve muscle spasm. THERAPEUTIC ACTIVITY (0 minutes): Therapeutic activities per grid below to improve mobility, strength, balance and coordination. Required minimal visual, verbal and tactile cues to promote static and dynamic balance in stting & standing, promote coordination of bilateral UE & LE, and promote motor control of bilateral UE & LE.    NEUROMUSCULAR RE-EDUCATION (0 minutes): Exercise/activities per grid below to improve balance, coordination, posture and proprioception. Required minimal visual, verbal and tactile cues to promote static and dynamic balance in sitting & standing, promote coordination of bilateral UE & LE, and promote motor control of bilateral UE & LE.    HEP   Access Code: [email or web address]  URL: [email or web address]  Date: 10/27/2021  Prepared by: Alysia Sanchez    Exercises  Supine Bridge - 1 x daily - 4 x weekly - 3 sets - 10 reps  Clamshell with Resistance - 1 x daily - 4 x weekly - 3 sets - 10 reps  Standing Heel Raise with Support - 1 x daily - 4 x weekly - 3 sets - 20 reps    S[employer]ia Portal  Treatment/Session Summary:    · Response to Treatment: Patient tolerated therapy well today. Patient demonstrates good static balance on firm and foam surfaces with increased difficulty with eyes closed. Patient denied pain at the end of the session, but said he could definitely tell he was stretched. PT encouraged patient to continue with HEP for max benefit. · Communication/Consultation: PT emphasized the importance of performing HEP consistently. · Equipment provided: Brandon julio  · Recommendations/Intent for next treatment session: Next visit will focus on progressing to more challenging activities.     Total Treatment Billable Duration: 38 minutes   PT Patient Time In/Time Out  Time In: 0845  Time Out: 1812 Liyah Centeno PT, DPT    Visit Approval Visit # Therapist initials Date A NS / Cx < 24 hr >24 hr Cx Comments          RECERT DATE: 3/27/23    1 CHICA 10/27/21 [x]  [] [] Initial evaluation    2 CHICA 11/1/21 [x] [] [] 1       [] [] []        [] [] []        [] [] []        [] [] []        [] [] []        [] [] []        [] [] []        [] [] []        [] [] []        [] [] []        [] [] []        [] [] []        [] [] []        [] [] []        [] [] []        [] [] []       Future Appointments   Date Time Provider Miko Awan   11/4/2021  8:45 AM Maci BURDICK, PT [facility]   11/8/2021  8:45 AM Rudolfo Coast, PT [facility]   11/11/2021  8:45 AM Rudolfo Coast, PT [facility]   11/15/2021  8:45 AM Starfish [facility], PT [facility]   11/18/2021  8:45 AM Maci BURDICK, PT [facility]   11/23/2021  8:40 AM Amanda Denny MD [facility]   4/14/2022  8:00 AM Amanda Denny MD [facility]

## 2021-11-04 ENCOUNTER — HOSPITAL ENCOUNTER (OUTPATIENT)
Dept: PHYSICAL THERAPY | Age: 76
Discharge: HOME | End: 2021-11-04
Payer: MEDICARE

## 2021-11-04 PROCEDURE — 97112 NEUROMUSCULAR REEDUCATION: CPT

## 2021-11-04 PROCEDURE — 97110 THERAPEUTIC EXERCISES: CPT

## 2021-11-04 NOTE — PROGRESS NOTES
Brenda [facility]  : 1945  [place]  Secondary: [place] [facility]  [address], [place], [facility] [address] [facility]  [phone]   [phone]      OUTPATIENT PHYSICAL THERAPY: Daily Treatment Note 2021  Visit Count: 3    ICD-10: Treatment Diagnosis:   Unsteadiness on feet (R26.81)  Loss of balance (R26.89)    Pre-treatment Symptoms/Complaints: Patient reports his R knee was feeling a little sore after his last session. Pain: Initial: 1/10 Post Session: 0/10      Updated Objective Findings: N/A    TREATMENT:     THERAPEUTIC EXERCISE (38 minutes): Exercises per grid below to improve mobility, strength and balance. Required minimal visual, verbal and tactile cues to promote proper body alignment, promote proper body posture and promote proper body mechanics. Progressed resistance, range and repetitions as indicated.      Date:  21 Date:  21 Date:   Activity/Exercise Parameters Parameters    Patient education HEP review     SciFit / NuStep 6 minutes  Level 1 6 minutes  Level 2 - \"R knee is hurting\"    Bridges 1 x 10 x 3 sec hold DL  1 x 10 each leg SL     Clam shells - sidelying  2 x 12 x 2 sec B  Green TB around thighs    Resisted side stepping 2 x 2 laps in  bars  GTB at midfoot     Feet together - firm surface 1 x 30 sec EC     Feet tandem - firm surface 1 x 30 sec EO  1 x 30 sec EC     Feet together - foam surface 1 x 30 sec EO  2 x 30 sec EC     Feet tandem - foam surface 1 x 30 sec EO     Standing heel raises 1 x 20 B on level surface  Ball between ankles     Rockerboard   4 minutes - A/P and M/L  Easier with M/L    Incline board - heel cord stretch 3 x 30 sec B  2nd rung; BUE support 2 x 30 sec B  2nd rung; BUE support    Ambulation  Backwards walking 4 x 40 ft    Gait with horizontal and vertical head turns while maintaining normal speed    Gait with dual tasks - counting backward from 100 by 3's    Gait with eyes closed (CGA)                        MANUAL THERAPY (0 minutes): Joint mobilization, soft tissue mobilization, and manipulation was utilized and necessary because of the patient's restricted joint motion and restricted motion of soft tissue. MODALITIES (0 minutes): Electrical Stimulation Therapy (IFC) was provided with intensity adjusted throughout treatment to patient tolerance. Hot Pack Therapy in order to provide analgesia and relieve muscle spasm. THERAPEUTIC ACTIVITY (0 minutes): Therapeutic activities per grid below to improve mobility, strength, balance and coordination. Required minimal visual, verbal and tactile cues to promote static and dynamic balance in stting & standing, promote coordination of bilateral UE & LE, and promote motor control of bilateral UE & LE.    NEUROMUSCULAR RE-EDUCATION (0 minutes): Exercise/activities per grid below to improve balance, coordination, posture and proprioception. Required minimal visual, verbal and tactile cues to promote static and dynamic balance in sitting & standing, promote coordination of bilateral UE & LE, and promote motor control of bilateral UE & LE.    HEP   Access Code: [email or web address]  URL: [email or web address]  Date: 10/27/2021  Prepared by: Ora Breath    Exercises  Supine Bridge - 1 x daily - 4 x weekly - 3 sets - 10 reps  Clamshell with Resistance - 1 x daily - 4 x weekly - 3 sets - 10 reps  Standing Heel Raise with Support - 1 x daily - 4 x weekly - 3 sets - 20 reps    Sparkbrowser Portal  Treatment/Session Summary:    · Response to Treatment: Patient tolerated therapy well today. Patient stating that his knee pain was affecting his ability to perform some of the exercises today. He also noted that some of the exercises and instructions reminded him of his late wife as she was unable to perform some of the tasks (e.g., counting backwards), which triggered some memories of her.  Patient was able to maintain his normal gait speed with dual tasks with a little less excursion horizontally and vertically than desired. Patient stating that the gait with horizontal head turns was the most difficult task of the ambulatory activities. PT encouraged patient to continue with exercises at home. · Communication/Consultation: PT emphasized the importance of performing HEP consistently. · Equipment provided: Brandon julio  · Recommendations/Intent for next treatment session: Next visit will focus on progressing to more challenging activities.     Total Treatment Billable Duration: 38 minutes   PT Patient Time In/Time Out  Time In: 0845  Time Out: 1812 Liyah Centeno PT, DPT    Visit Approval Visit # Therapist initials Date A NS / Cx < 24 hr >24 hr Cx Comments          RECERT DATE: 3/36/48    1  10/27/21 [x]  [] [] Initial evaluation    2  11/1/21 [x] [] [] 1    3  11/4/21 [x] [] [] 2       [] [] []        [] [] []        [] [] []        [] [] []        [] [] []        [] [] []        [] [] []        [] [] []        [] [] []        [] [] []        [] [] []        [] [] []        [] [] []        [] [] []        [] [] []       Future Appointments   Date Time Provider Miko Awan   11/8/2021  8:45 AM Raúl BURDICK, PT [facility]   11/11/2021  8:45 AM Claudia Cabral PT [facility]   11/15/2021  8:45 AM Claudia Cabral, PT [facility]   11/18/2021  8:45 AM Raúl BURDICK, PT [facility]   11/23/2021  8:40 AM Noemi Kwong MD [facility]   4/14/2022  8:00 AM Noemi Kwong MD [facility]

## 2021-11-08 ENCOUNTER — HOSPITAL ENCOUNTER (OUTPATIENT)
Dept: PHYSICAL THERAPY | Age: 76
Discharge: HOME | End: 2021-11-08
Payer: MEDICARE

## 2021-11-08 PROCEDURE — 97110 THERAPEUTIC EXERCISES: CPT

## 2021-11-08 PROCEDURE — 97112 NEUROMUSCULAR REEDUCATION: CPT

## 2021-11-08 NOTE — PROGRESS NOTES
Cali Quiros Ely  : 1945  [place]  Secondary: [place] [facility]  [address], [address], [place], [facility] [address] [facility]  [phone]   [phone]      OUTPATIENT PHYSICAL THERAPY: Daily Treatment Note 2021  Visit Count: 4    ICD-10: Treatment Diagnosis:   Unsteadiness on feet (R26.81)  Loss of balance (R26.89)    Pre-treatment Symptoms/Complaints: Patient report he is doing ok this morning. Denies any new changes. Pain: Initial: 0/10 Post Session: 0/10      Updated Objective Findings: N/A    TREATMENT:     THERAPEUTIC EXERCISE (40 minutes): Exercises per grid below to improve mobility, strength and balance. Required minimal visual, verbal and tactile cues to promote proper body alignment, promote proper body posture and promote proper body mechanics. Progressed resistance, range and repetitions as indicated.      Date:  21 Date:  21 Date:  21   Activity/Exercise Parameters Parameters    Patient education HEP review     SciFit / NuStep 6 minutes  Level 1 6 minutes  Level 2 - \"R knee is hurting\" 7 minutes  Level 2   Bridges 1 x 10 x 3 sec hold DL  1 x 10 each leg SL     Clam shells - sidelying  2 x 12 x 2 sec B  Green TB around thighs    Resisted side stepping 2 x 2 laps in  bars  GTB at midfoot     Feet together - firm surface 1 x 30 sec EC     Feet tandem - firm surface 1 x 30 sec EO  1 x 30 sec EC     Feet together - foam surface 1 x 30 sec EO  2 x 30 sec EC     Feet tandem - foam surface 1 x 30 sec EO     Standing heel raises 1 x 20 B on level surface  Ball between ankles  2 x 20 with toes on 2\" step  Ball between ankles   Rockerboard   4 minutes - A/P and M/L  Easier with M/L    Incline board - heel cord stretch 3 x 30 sec B  2nd rung; BUE support 2 x 30 sec B  2nd rung; BUE support 3 x 30 sec B  2nd rung; BUE support   Ambulation  Backwards walking 4 x 40 ft    Gait with horizontal and vertical head turns while maintaining normal speed    Gait with dual tasks - counting backward from 100 by 3's    Gait with eyes closed (CGA) Karaoke/grapevine with same foot leading forward/backward and then alternating forward/backward with CGA from PT with no LOB    Gait with horizontal and vertical head turns while maintaining normal speed with cues for more excursion with head movements    Gait with fast pivots/turns in either direction at verbal cues from PT   Single leg stance - tapping cones   Tapping cones of various colors in assorted patterns called out by PT on each leg without UE support  4-5 episodes of LOB in  bars  6 minutes                                               MANUAL THERAPY (0 minutes): Joint mobilization, soft tissue mobilization, and manipulation was utilized and necessary because of the patient's restricted joint motion and restricted motion of soft tissue. MODALITIES (0 minutes): Electrical Stimulation Therapy (IFC) was provided with intensity adjusted throughout treatment to patient tolerance. Hot Pack Therapy in order to provide analgesia and relieve muscle spasm. THERAPEUTIC ACTIVITY (0 minutes): Therapeutic activities per grid below to improve mobility, strength, balance and coordination. Required minimal visual, verbal and tactile cues to promote static and dynamic balance in stting & standing, promote coordination of bilateral UE & LE, and promote motor control of bilateral UE & LE.    NEUROMUSCULAR RE-EDUCATION (0 minutes): Exercise/activities per grid below to improve balance, coordination, posture and proprioception. Required minimal visual, verbal and tactile cues to promote static and dynamic balance in sitting & standing, promote coordination of bilateral UE & LE, and promote motor control of bilateral UE & LE.    HEP   Access Code: [email or web address]  URL: [email or web address]  Date: 10/27/2021  Prepared by: Portillo Ryan    Exercises  Supine Bridge - 1 x daily - 4 x weekly - 3 sets - 10 reps  Clamshell with Resistance - 1 x daily - 4 x weekly - 3 sets - 10 reps  Standing Heel Raise with Support - 1 x daily - 4 x weekly - 3 sets - 20 reps    Farecast Portal  Treatment/Session Summary:    · Response to Treatment: Patient tolerated therapy well today. Patient stating that it was \"not a good idea\" to perform some of the more progressive balance exercises this morning, but he did really well considering. He says that he feels like he's made some progression since coming to therapy and that he's been compliant with exercises at home. He says he thinks that his visit later in the week will be appropriate timing for discharge, so we'll plan for that. PT encouraged patient to continue with HEP at home. · Communication/Consultation: PT emphasized the importance of performing HEP consistently. · Equipment provided: Lukasz julio  · Recommendations/Intent for next treatment session: Next visit will focus on progressing to more challenging activities.     Total Treatment Billable Duration: 40 minutes   PT Patient Time In/Time Out  Time In: 0845  Time Out: 1812 Liyah Centeno PT, DPT    Visit Approval Visit # Therapist initials Date A NS / Cx < 24 hr >24 hr Cx Comments          RECERT DATE: 5/03/23    1  10/27/21 [x]  [] [] Initial evaluation    2  11/1/21 [x] [] [] 1    3  11/4/21 [x] [] [] 2    4  11/8/21 [x] [] [] 3       [] [] []        [] [] []        [] [] []        [] [] []        [] [] []        [] [] []        [] [] []        [] [] []        [] [] []        [] [] []        [] [] []        [] [] []        [] [] []        [] [] []       Future Appointments   Date Time Provider Miko Awan   11/11/2021  8:45 AM Jacinto Wyatt, PT [facility]   11/15/2021  8:45 AM Jacinto Wyatt, PT [facility]   11/18/2021  8:45 AM Sofiya BURDICK PT [facility]   11/23/2021  8:40 AM Tessa Rivas MD [facility]   4/14/2022  8:00 AM Tessa Rivas MD [facility]

## 2021-11-11 ENCOUNTER — HOSPITAL ENCOUNTER (OUTPATIENT)
Dept: PHYSICAL THERAPY | Age: 76
Discharge: HOME | End: 2021-11-11
Payer: MEDICARE

## 2021-11-11 PROCEDURE — 97112 NEUROMUSCULAR REEDUCATION: CPT

## 2021-11-11 PROCEDURE — 97110 THERAPEUTIC EXERCISES: CPT

## 2021-11-11 NOTE — PROGRESS NOTES
Yoni Palomino [place]  : 1945  [place]  Secondary: Rodd[place]upal at [facility]  [address] [facility], [facility], [facility] [address] [facility]  [phone]   [phone]      OUTPATIENT PHYSICAL THERAPY: Daily Treatment Note 2021  Visit Count: 5    ICD-10: Treatment Diagnosis:   Unsteadiness on feet (R26.81)  Loss of balance (R26.89)    Pre-treatment Symptoms/Complaints: Patient report he is doing ok this morning. Denies any new changes. Pain: Initial: 0/10 Post Session: 0/10      Updated Objective Findings: N/A    TREATMENT:     THERAPEUTIC EXERCISE (39 minutes): Exercises per grid below to improve mobility, strength and balance. Required minimal visual, verbal and tactile cues to promote proper body alignment, promote proper body posture and promote proper body mechanics. Progressed resistance, range and repetitions as indicated.      Date:  21 Date:  21 Date:  21 Date:  21   Activity/Exercise Parameters Parameters     Patient education HEP review      SciFit / NuStep 6 minutes  Level 1 6 minutes  Level 2 - \"R knee is hurting\" 7 minutes  Level 2 8 minutes  Level 2.5   Bridges 1 x 10 x 3 sec hold DL  1 x 10 each leg SL      Clam shells - sidelying  2 x 12 x 2 sec B  Green TB around thighs     Resisted side stepping 2 x 2 laps in  bars  GTB at midfoot      Feet together - firm surface 1 x 30 sec EC      Feet tandem - firm surface 1 x 30 sec EO  1 x 30 sec EC      Feet together - foam surface 1 x 30 sec EO  2 x 30 sec EC      Feet tandem - foam surface 1 x 30 sec EO      Standing heel raises 1 x 20 B on level surface  Ball between ankles  2 x 20 with toes on 2\" step  Ball between ankles 2 x 25 with toes on 2\" step  Ball between ankles   Rockerboard   4 minutes - A/P and M/L  Easier with M/L  4 minutes - A/P and M/L  More UE support with A/P vs M/L   Incline board - heel cord stretch 3 x 30 sec B  2nd rung; BUE support 2 x 30 sec B  2nd rung; BUE support 3 x 30 sec B  2nd rung; BUE support    Ambulation  Backwards walking 4 x 40 ft    Gait with horizontal and vertical head turns while maintaining normal speed    Gait with dual tasks - counting backward from 100 by 3's    Gait with eyes closed (CGA) Karaoke/grapevine with same foot leading forward/backward and then alternating forward/backward with CGA from PT with no LOB    Gait with horizontal and vertical head turns while maintaining normal speed with cues for more excursion with head movements    Gait with fast pivots/turns in either direction at verbal cues from PT Karaoke/grapevine with same foot leading forward/backward and then alternating forward/backward with CGA from PT with no LOB    Obstacle course (\"the floor is lava\") consisting of foam stepping stones, plastic [place] IRA pads, steps, and foam balance beam - able to complete with 90% accuracy without stepping off (\"into the lava\")   Single leg stance - tapping cones   Tapping cones of various colors in assorted patterns called out by PT on each leg without UE support  4-5 episodes of LOB in  bars  6 minutes Tapping cones of various colors in assorted patterns called out by PT on each leg without UE support  4-5 episodes of LOB in  bars  8 minutes   + Added cones for more targets  + Standing on foam pad SLS  + Names of fruits vs colors                                                      MANUAL THERAPY (0 minutes): Joint mobilization, soft tissue mobilization, and manipulation was utilized and necessary because of the patient's restricted joint motion and restricted motion of soft tissue. MODALITIES (0 minutes): Electrical Stimulation Therapy (IFC) was provided with intensity adjusted throughout treatment to patient tolerance. Hot Pack Therapy in order to provide analgesia and relieve muscle spasm. THERAPEUTIC ACTIVITY (0 minutes):  Therapeutic activities per grid below to improve mobility, strength, balance and coordination. Required minimal visual, verbal and tactile cues to promote static and dynamic balance in stting & standing, promote coordination of bilateral UE & LE, and promote motor control of bilateral UE & LE.    NEUROMUSCULAR RE-EDUCATION (0 minutes): Exercise/activities per grid below to improve balance, coordination, posture and proprioception. Required minimal visual, verbal and tactile cues to promote static and dynamic balance in sitting & standing, promote coordination of bilateral UE & LE, and promote motor control of bilateral UE & LE.    HEP   Access Code: [email or web address]  URL: [email or web address]  Date: 10/27/2021  Prepared by: Leigh Carmona    Exercises  Supine Bridge - 1 x daily - 4 x weekly - 3 sets - 10 reps  Clamshell with Resistance - 1 x daily - 4 x weekly - 3 sets - 10 reps  Standing Heel Raise with Support - 1 x daily - 4 x weekly - 3 sets - 20 reps    [employer] Portal  Treatment/Session Summary:    · Response to Treatment: Patient tolerated therapy well today. Patient deciding that he would like to feel a little bit more confident in navigating uneven terrain before discharging and feeling 100% steady, so we're going to address more challenging tasks in the next few visits. Patient is progressing well with challenging balance tasks each session. He denied reports of pain at the end of the session. Encouraged to continue with HEP at home and planning to change up exercises next visit. · Communication/Consultation: PT emphasized the importance of performing HEP consistently. · Equipment provided: Grayson Bundy thermarcosd  · Recommendations/Intent for next treatment session: Next visit will focus on progressing to more challenging activities.     Total Treatment Billable Duration: 39 minutes     Prasad Pritchett PT, DPT    Visit Approval Visit # Therapist initials Date A NS / Cx < 24 hr >24 hr Cx Comments          RECERT DATE: 4/38/68    1 CHICA 10/27/21 [x]  [] [] Initial evaluation    2 CHICA 11/1/21 [x] [] [] 1    3 CHICA 11/4/21 [x] [] [] 2    4 CHICA 11/8/21 [x] [] [] 3    5 CHICA 11/11/21 [x] [] [] 4       [] [] []        [] [] []        [] [] []        [] [] []        [] [] []        [] [] []        [] [] []        [] [] []        [] [] []        [] [] []        [] [] []        [] [] []        [] [] []       Future Appointments   Date Time Provider Miko Awan   11/15/2021  8:45 AM Lisa Mejía, PT [facility]   11/18/2021  8:45 AM MAGGI Hearn [facility]   11/23/2021  8:40 AM Mahnaz Hawthorne MD [facility]   4/14/2022  8:00 AM Mahnaz Hawthorne MD [facility]

## 2021-11-15 ENCOUNTER — HOSPITAL ENCOUNTER (OUTPATIENT)
Dept: PHYSICAL THERAPY | Age: 76
Discharge: HOME | End: 2021-11-15
Payer: MEDICARE

## 2021-11-15 PROCEDURE — 97110 THERAPEUTIC EXERCISES: CPT

## 2021-11-15 NOTE — PROGRESS NOTES
Sissy Libman [place]  : 1945  [place]  Secondary: Roddy[place]et at [facility]  [address], [address], [place], [facility] [address] [facility]  [phone]   [phone]      OUTPATIENT PHYSICAL THERAPY: Daily Treatment Note 11/15/2021  Visit Count: 6    ICD-10: Treatment Diagnosis:   Unsteadiness on feet (R26.81)  Loss of balance (R26.89)    Pre-treatment Symptoms/Complaints: Patient reports he has no pain this morning and doesn't remember being sore after his last session. Pain: Initial: 0/10 Post Session: 0/10      Updated Objective Findings: N/A    TREATMENT:     THERAPEUTIC EXERCISE (42 minutes): Exercises per grid below to improve mobility, strength and balance. Required minimal visual, verbal and tactile cues to promote proper body alignment, promote proper body posture and promote proper body mechanics. Progressed resistance, range and repetitions as indicated. Date:  21 Date:  21 Date:  21 Date:  11/15/21   Activity/Exercise Parameters      Patient education       SciFit / NuStep 6 minutes  Level 2 - \"R knee is hurting\" 7 minutes  Level 2 8 minutes  Level 2.5 7 minutes  Level 2   Bridges    2 x 10 - LE exten.  on exercise ball  Reported difficulty   Clam shells - sidelying 2 x 12 x 2 sec B  Green TB around thighs      Lateral tap downs    2 x 8 B on 4\" step  Hard to not put full weight down   Resisted side stepping    2 x 2 laps in  bars  Green TB at midfoot   Sumo squats with weight    2 x 10 with 20# KB  Tapping to 8\" step stool   Feet together - firm surface       Feet tandem - firm surface       Feet together - foam surface       Feet tandem - foam surface       Standing heel raises  2 x 20 with toes on 2\" step  Ball between ankles 2 x 25 with toes on 2\" step  Ball between ankles    Rockerboard  4 minutes - A/P and M/L  Easier with M/L  4 minutes - A/P and M/L  More UE support with A/P vs M/L    Incline board - heel cord stretch 2 x 30 sec B  2nd rung; BUE support 3 x 30 sec B  2nd rung; BUE support     Ambulation Backwards walking 4 x 40 ft    Gait with horizontal and vertical head turns while maintaining normal speed    Gait with dual tasks - counting backward from 100 by 3's    Gait with eyes closed (CGA) Karaoke/grapevine with same foot leading forward/backward and then alternating forward/backward with CGA from PT with no LOB    Gait with horizontal and vertical head turns while maintaining normal speed with cues for more excursion with head movements    Gait with fast pivots/turns in either direction at verbal cues from PT Karaoke/grapevine with same foot leading forward/backward and then alternating forward/backward with CGA from PT with no LOB    Obstacle course (\"the floor is lava\") consisting of foam stepping stones, plastic [place] pads, steps, and foam balance beam - able to complete with 90% accuracy without stepping off (\"into the lava\")    Single leg stance - tapping cones  Tapping cones of various colors in assorted patterns called out by PT on each leg without UE support  4-5 episodes of LOB in  bars  6 minutes Tapping cones of various colors in assorted patterns called out by PT on each leg without UE support  4-5 episodes of LOB in  bars  8 minutes   + Added cones for more targets  + Standing on foam pad SLS  + Names of fruits vs colors                                                       MANUAL THERAPY (0 minutes): Joint mobilization, soft tissue mobilization, and manipulation was utilized and necessary because of the patient's restricted joint motion and restricted motion of soft tissue. MODALITIES (0 minutes): Electrical Stimulation Therapy (IFC) was provided with intensity adjusted throughout treatment to patient tolerance. Hot Pack Therapy in order to provide analgesia and relieve muscle spasm. THERAPEUTIC ACTIVITY (0 minutes):  Therapeutic activities per grid below to improve mobility, strength, balance and coordination. Required minimal visual, verbal and tactile cues to promote static and dynamic balance in stting & standing, promote coordination of bilateral UE & LE, and promote motor control of bilateral UE & LE.    NEUROMUSCULAR RE-EDUCATION (0 minutes): Exercise/activities per grid below to improve balance, coordination, posture and proprioception. Required minimal visual, verbal and tactile cues to promote static and dynamic balance in sitting & standing, promote coordination of bilateral UE & LE, and promote motor control of bilateral UE & LE.    HEP   Access Code: [email or web address]  URL: [email or web address]  Date: 10/27/2021  Prepared by: Neha Mejia    Exercises  Supine Bridge - 1 x daily - 4 x weekly - 3 sets - 10 reps  Clamshell with Resistance - 1 x daily - 4 x weekly - 3 sets - 10 reps  Standing Heel Raise with Support - 1 x daily - 4 x weekly - 3 sets - 20 reps  Side Stepping with Resistance at Feet - 1 x daily - 5 x weekly - 3 sets - 10 reps  Leaning Against Wall Toe Raises - 1 x daily - 5 x weekly - 3 sets - 15-20 reps  Heel Raise on Step - 1 x daily - 5 x weekly - 3 sets - 15-20 reps    [employer] Portal  Treatment/Session Summary:    · Response to Treatment: Patient tolerated therapy well today. Patient performing more progressive strengthening exercises today and stating they were difficult. He required minimal verbal and visual cues with fair carryover, likely due to challenge of task and difficulty with mastering. He performed the weighted sumo squats with great form, but said he was starting to feel it in his R knee near the end of the last set. PT provided with patient with updated HEP handout; he verbalized understanding. · Communication/Consultation: PT emphasized the importance of performing HEP consistently.   · Equipment provided: Jayashree julio  · Recommendations/Intent for next treatment session: Next visit will focus on progressing to more challenging activities.     Total Treatment Billable Duration: 42 minutes  PT Patient Time In/Time Out  Time In: 0845  Time Out: 1812 Liyah Centeno, PT, DPT    Visit Approval Visit # Therapist initials Date A NS / Cx < 24 hr >24 hr Cx Comments          RECERT DATE: 4/32/33    1  10/27/21 [x]  [] [] Initial evaluation    2  11/1/21 [x] [] [] 1    3  11/4/21 [x] [] [] 2    4  11/8/21 [x] [] [] 3    5  11/11/21 [x] [] [] 4    6  11/15/21 [x] [] [] 5       [] [] []        [] [] []        [] [] []        [] [] []        [] [] []        [] [] []        [] [] []        [] [] []        [] [] []        [] [] []        [] [] []        [] [] []       Future Appointments   Date Time Provider Miko Awan   11/15/2021  8:45 AM Lobo Orozco PT [facility]   11/18/2021  8:45 AM Maribel BURDICK PT [facility]   11/23/2021  8:40 AM Wilber Dorsey MD [facility]   4/14/2022  8:00 AM Wilber Dorsey MD [facility]

## 2021-11-18 ENCOUNTER — HOSPITAL ENCOUNTER (OUTPATIENT)
Dept: PHYSICAL THERAPY | Age: 76
Discharge: HOME | End: 2021-11-18
Payer: MEDICARE

## 2021-11-18 PROCEDURE — 97110 THERAPEUTIC EXERCISES: CPT
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
## 2021-11-29 ENCOUNTER — HOSPITAL ENCOUNTER (OUTPATIENT)
Dept: PHYSICAL THERAPY | Age: 76
Discharge: HOME | End: 2021-11-29
Payer: MEDICARE

## 2021-11-29 PROCEDURE — 97110 THERAPEUTIC EXERCISES: CPT

## 2021-11-29 NOTE — PROGRESS NOTES
Donnell Morales [place]  : 1945  [place]  Secondary: [place] at [facility]  [address], [address] [place] [address], [place], [facility]  [phone]   [phone]      OUTPATIENT PHYSICAL THERAPY: Daily Treatment Note 2021  Visit Count: 8    ICD-10: Treatment Diagnosis:   Unsteadiness on feet (R26.81)  Loss of balance (R26.89)    Pre-treatment Symptoms/Complaints: Patient reports he is doing good this morning. Denies any new changes in symptoms. Says he had some biopsies for some places on his skin. Pain: Initial: 0/10  Post Session: 0/10      Updated Objective Findings: N/A    TREATMENT:     THERAPEUTIC EXERCISE (40 minutes): Exercises per grid below to improve mobility, strength and balance. Required minimal visual, verbal and tactile cues to promote proper body alignment, promote proper body posture and promote proper body mechanics. Progressed resistance, range and repetitions as indicated. Date:  21 Date:  11/15/21 Date:  21 Date:  21   Activity/Exercise       Patient education       SciFit / NuStep 8 minutes  Level 2.5 7 minutes  Level 2 8 minutes  Level 2.5 8 minutes  Level 3   Bridges  2 x 10 - LE exten.  on exercise ball  Reported difficulty     Clam shells - sidelying       Lateral / forward tap downs  2 x 8 B on 4\" step - lateral  Hard to not put full weight down 2 x 8 B on 2\" step - forward    Resisted side stepping  2 x 2 laps in  bars  Green TB at midfoot  2 x 2 laps in  bars  Green TB at midfoot   Sumo squats with weight  2 x 10 with 20# KB  Tapping to 8\" step stool     TRX squats   2 x 10 with BUE support    Standing hip extension   2 x 10 B with BUE support    Feet together - firm surface       Feet tandem - firm surface       Feet together - foam surface       Feet tandem - foam surface       Standing heel raises 2 x 25 with toes on 2\" step  Ball between ankles  2 x 15 SL each side   2 x 25 with toes on 2\" step  Attempted SL with toes on step   Rockerboard  4 minutes - A/P and M/L  More UE support with A/P vs M/L      Incline board - heel cord stretch    3 x 45 sec B  2nd rung   Ambulation Karaoke/grapevine with same foot leading forward/backward and then alternating forward/backward with CGA from PT with no LOB    Obstacle course (\"the floor is lava\") consisting of foam stepping stones, plastic [place] pads, steps, and foam balance beam - able to complete with 90% accuracy without stepping off (\"into the lava\")      Single leg stance - tapping cones Tapping cones of various colors in assorted patterns called out by PT on each leg without UE support  4-5 episodes of LOB in  bars  8 minutes   + Added cones for more targets  + Standing on foam pad SLS  + Names of fruits vs colors      Leg press machine    1 x 10 B at 70#  1 x 10 B at 100#  1 x 10 B at 130#                                               MANUAL THERAPY (0 minutes): Joint mobilization, soft tissue mobilization, and manipulation was utilized and necessary because of the patient's restricted joint motion and restricted motion of soft tissue. MODALITIES (0 minutes): Electrical Stimulation Therapy (IFC) was provided with intensity adjusted throughout treatment to patient tolerance. Hot Pack Therapy in order to provide analgesia and relieve muscle spasm. THERAPEUTIC ACTIVITY (0 minutes): Therapeutic activities per grid below to improve mobility, strength, balance and coordination. Required minimal visual, verbal and tactile cues to promote static and dynamic balance in stting & standing, promote coordination of bilateral UE & LE, and promote motor control of bilateral UE & LE.    NEUROMUSCULAR RE-EDUCATION (0 minutes): Exercise/activities per grid below to improve balance, coordination, posture and proprioception.  Required minimal visual, verbal and tactile cues to promote static and dynamic balance in sitting & standing, promote coordination of bilateral UE & LE, and promote motor control of bilateral UE & LE.    HEP   Access Code: [email or web address]  URL: [email or web address]  Date: 10/27/2021  Prepared by: Ciara Chanel    Exercises  Supine Bridge - 1 x daily - 4 x weekly - 3 sets - 10 reps  Clamshell with Resistance - 1 x daily - 4 x weekly - 3 sets - 10 reps  Standing Heel Raise with Support - 1 x daily - 4 x weekly - 3 sets - 20 reps  Side Stepping with Resistance at Feet - 1 x daily - 5 x weekly - 3 sets - 10 reps  Leaning Against Wall Toe Raises - 1 x daily - 5 x weekly - 3 sets - 15-20 reps  Heel Raise on Step - 1 x daily - 5 x weekly - 3 sets - 15-20 reps    coRank  Treatment/Session Summary:    · Response to Treatment: Patient tolerated therapy well today. Patient and PT discussed discharging from PT at upcoming visit at the end of the week. He says that he feels like he's noticed some improvements since initiating PT services, but thinks he's reached a point to where he can independently perform his HEP in addition to participating in his . [facility] program. Patient complaining of some calf tightness during the session today, so finished up with some static stretching at the end of the visit today. Planning to provide patient with updated discharge HEP at next visit. · Communication/Consultation: PT emphasized the importance of performing HEP consistently. · Equipment provided: Aura Sandifer theraband  · Recommendations/Intent for next treatment session: Next visit will focus on progressing to more challenging activities.     Total Treatment Billable Duration: 40 minutes   PT Patient Time In/Time Out  Time In: 3012  Time Out: 1812 Liyah Centeno PT, DPT    Visit Approval Visit # Therapist initials Date A NS / Cx < 24 hr >24 hr Cx Comments          RECERT DATE: 4/15/24    1  10/27/21 [x]  [] [] Initial evaluation    2  11/1/21 [x] [] [] 1    3 CHICA 11/4/21 [x] [] [] 2    4  11/8/21 [x] [] [] 3    5  11/11/21 [x] [] [] 4    6 CHICA 11/15/21 [x] [] [] 5    7 CHICA 11/18/21 [x] [] [] 6    8 CHICA 11/29/21 [x] [] [] 7       [] [] []        [] [] []        [] [] []        [] [] []        [] [] []        [] [] []        [] [] []        [] [] []        [] [] []        [] [] []       Future Appointments   Date Time Provider Miko Awan   12/2/2021  8:45 AM Aramis BURDICK, PT [facility]   12/6/2021  8:45 AM Lisa Mejía PT [facility]   12/9/2021  8:45 AM Armais BURDICK, PT [facility]   4/14/2022  8:00 AM Mahnaz Hawthorne MD [facility]

## 2021-12-02 ENCOUNTER — HOSPITAL ENCOUNTER (OUTPATIENT)
Dept: PHYSICAL THERAPY | Age: 76
Discharge: HOME | End: 2021-12-02
Payer: MEDICARE

## 2021-12-02 PROCEDURE — 97110 THERAPEUTIC EXERCISES: CPT

## 2021-12-02 NOTE — THERAPY DISCHARGE
89}  Mariah Torrez  : 1945  [place]  Secondary: [place] [address] [facility] [address], [address] [facility], [place], [facility]  [phone]   [phone]       OUTPATIENT PHYSICAL THERAPY: Discharge Summary 2021   ICD-10: Treatment Diagnosis:   Unsteadiness on feet (R26.81)  Loss of balance (R26.89)    Precautions/Allergies:   Patient has no known allergies. TREATMENT PLAN:  Effective Dates: 10/27/2021 TO 2022 (90 days)  Frequency/Duration: 2 times a week for 90 days MEDICAL/REFERRING DIAGNOSIS:  Other abnormalities of gait and mobility [R26.89]   DATE OF ONSET: Chronic  REFERRING PHYSICIAN: Teja Weeks MD MD Orders: Evaluate & Treat  Return MD Appointment: TBD     DISCHARGE ASSESSMENT: Mr. Rosalinda Tomlin has articipated in 8 physical therapy visits since his initial evaluation on 10/27/21. He demonstrates gains in LE strength and confidence with balance as evident by his responses to subjective and objective outcome measures. Mr. Rosalinda Tomlin states that he notices he doesn't have to rely on additional stability while walking up and down the hallway and that he doesn't notice as much unsteadiness when transferring in different positions. With consistent performance of his HEP and participation in the [employer] program, he should continue to have success with progressing his functional mobility. He met all of the goals established for him at the initiation of his plan of care. He will be discharged from PT at this time. GOALS:    Discharge Goals: Time Frame: 6 weeks  1. Patient will demonstrate and verbalize independence with HEP. MET 21  2. Patient will demonstrate an improved score of at least 80% on the ABC Scale to indicate improvement in balance. MET 21  3. Patient will demonstrate ability to walk 1450 feet via 6MWT to demonstrate gains in endurance and activity tolerance. MET 21  4.  Patient will verbalize 3 fall prevention strategies to improve safety within the home and community. MET 12/2/21  5. Patient will demonstrate ability to ambulate 500 feet on level and unlevel surfaces with LRAD independently. MET 12/2/21    OUTCOME MEASURES:   Tool Used: Tinetti  Score:  Initial: 28/28 (Date: 10/27/21)   Interpretation of Score: The maximum score for the gait component is 12 points. The maximum score for the balance component is 16 points. The maximum total score is 28 points. In general, patients who score below 19 are at a high risk for falls. Patients who score in the range of 19-24 indicate that the patient has a risk for falls. Tool Used: 6-Minute Walk Test  Score:  Initial: 1300 feet (Date: 10/27/21) Most Recent: 1460 feet (Date: 12/2/21)   Interpretation of Score: Normal range varies but is approximately 6966-7309 feet. Tool Used: Activities-Specific Balance Confidence (ABC) Scale  Score:  Initial: 75% Confidence (Date: 10/27/21) Most Recent: 90.6% Confidence (Date: 12/2/21)   Interpretation of Score: The test rates the patients percentage of confidence with functional daily activities from 0%, indicating no confidence, to 100%, indicating complete confidence. The percentages are added together and then averaged. If the average is less than 80%, this indicates significant impairment with daily activities. Thank you for this referral,    Rashad Hoffmann, PT, DPT    Referring Physician Signature: Kirk Salvador MD *No signature required for this document. HISTORY:   History of Injury/Illness (Reason for Referral):  Patient presents to physical therapy with complaints of unsteadiness on his feet and loss of balance. He says that he attends activities, such as [employer]neakers, at the senior center in [place] and that a PT came and visited the center who recommended physical therapy who struggled with their balance. He decided to reach out to his physician to retrieve a referral for PT.  He says he has not fallen recently, but that his last incident was a few months ago. He says that he's noticed when he walks down the hallway at home that he tries to hold onto furniture or the wall for added support. He also notes that turning makes him feel unsteady. He says that he will use a cane occasionally whenever his plantar fasciitis flares up. Past Medical History/Comorbidities:   Mr. Fabiola Harden  has a past medical history of Arthritis, Cancer ([address] Chronic pain, GERD (gastroesophageal reflux disease), Headache, Hypercholesterolemia, and Hypertension. Mr. Fabiola Harden  has a past surgical history that includes hx tonsillectomy. Social History/Living Environment:   Lives alone in a single-story home with 2 steps to enter (single porch post available at front entrance)    Prior Level of Function/Work/Activity:  Independent with functional mobility and ADLs     Ambulatory/Rehab Services H2 Model Falls Risk Assessment   Risk Factors:       (1)  Gender [Male]       (5)  History of Recent Falls [w/in 3 months] Ability to Rise from Chair:       (0)  Ability to rise in a single movement   Falls Prevention Plan:       No modifications necessary   Total: (5 or greater = High Risk): 6   ©2010 [facility] [address] [place] [address] States Patent #[id number]. Federal Law prohibits the replication, distribution or use without written permission from [facility] [employer]   Current Medications:       Current Outpatient Medications:     fluticasone propionate (Flonase) 50 mcg/actuation nasal spray, 1 [place] by Both Nostrils route daily. PRN allergy symptoms, Disp: 1 Each, Rfl: 5    hydroCHLOROthiazide (HYDRODIURIL) 12.5 mg tablet, Take 1 Tablet by mouth daily. , Disp: 90 Tablet, Rfl: 1    metoprolol succinate (TOPROL-XL) 50 mg XL tablet, Take 1 Tablet by mouth daily. , Disp: 90 Tablet, Rfl: 1    simvastatin (ZOCOR) 40 mg tablet, Take 1 Tablet by mouth nightly., Disp: 90 Tablet, Rfl: 1    losartan (COZAAR) 100 mg tablet, Take 1 Tablet by mouth daily. , Disp: 90 Tablet, Rfl: 1    diclofenac (VOLTAREN) 1 % gel, APPLY 2GRAMS TO AFFECTED AREA 2 TIMES A DAY AS NEEDED, Disp: 100 g, Rfl: 4    loteprednol etabonate (LOTEMAX) 0.5 % ophthalmic suspension, Administer 1 Drop to right eye as needed. , Disp: , Rfl:       Number of Personal Factors/Comorbidities that affect the Plan of Care: 1-2: MODERATE COMPLEXITY   EXAMINATION:   ROM:          Appears to be within normal limits    Strength:     Date: 10/27/21 12/2/21 12/2/21    RIGHT LEFT RIGHT LEFT   Hip flexion 5/5 5/5     Hip extension 4+/5 4+/5 4+/5 4+/5   Hip abduction 3+/5 3+/5 4/5 4/5   Knee flexion 5/5 5/5     Knee extension 5/5 5/5     Ankle dorsiflexion 5/5 5/5     Ankle plantarflexion 3-/5 3-/5 3/5 3/5     Neurological Screen:        Dermatomes: no discrepancy with light touch detection    Functional Mobility:         Bed mobility: independent        Transfers: independent        Gait: decreased arm swing, decreased trunk rotation        Stairs: not tested    Balance:     Date: 10/27/21 Date: 12/2/21    Romberg - eyes open  30 sec --    Romberg - eyes closed  30 sec --    Single leg stance - RLE  9 sec 16 sec    Single leg stance - LLE  19 sec 30 sec

## 2021-12-02 NOTE — PROGRESS NOTES
Everlena Mcardle [place]  : 1945  [place]  Secondary: [place] [facility]  [address], [address], [facility], [facility]  [phone]   [phone]      OUTPATIENT PHYSICAL THERAPY: Daily Treatment Note 2021  Visit Count: 9    ICD-10: Treatment Diagnosis:   Unsteadiness on feet (R26.81)  Loss of balance (R26.89)    Pre-treatment Symptoms/Complaints: Patient reports he is doing good this morning. Agreeable to discharge this visit. Says he's feeling a little sore after lifting some stuff at home over the past few days. Pain: Initial: 0/10  Post Session: 0/10      Updated Objective Findings: N/A    TREATMENT:     THERAPEUTIC EXERCISE (38 minutes): Exercises per grid below to improve mobility, strength and balance. Required minimal visual, verbal and tactile cues to promote proper body alignment, promote proper body posture and promote proper body mechanics. Progressed resistance, range and repetitions as indicated. Date:  11/15/21 Date:  21 Date:  21 Date:  21   Activity/Exercise       Patient education    DC assessment and HEP review   SciFit / NuStep 7 minutes  Level 2 8 minutes  Level 2.5 8 minutes  Level 3 6MWT   Bridges 2 x 10 - LE exten.  on exercise ball  Reported difficulty      Clam shells - sidelying       Lateral / forward tap downs 2 x 8 B on 4\" step - lateral  Hard to not put full weight down 2 x 8 B on 2\" step - forward     Resisted side stepping 2 x 2 laps in  bars  Green TB at midfoot  2 x 2 laps in  bars  Green TB at midfoot    Sumo squats with weight 2 x 10 with 20# KB  Tapping to 8\" step stool      TRX squats  2 x 10 with BUE support     Standing hip extension  2 x 10 B with BUE support     Feet together - firm surface       Feet tandem - firm surface       Feet together - foam surface       Feet tandem - foam surface       Standing heel raises  2 x 15 SL each side   2 x 25 with toes on 2\" step  Attempted SL with toes on step    Rockerboard        Incline board - heel cord stretch   3 x 45 sec B  2nd rung    Ambulation       Single leg stance - tapping cones       Leg press machine   1 x 10 B at 70#  1 x 10 B at 100#  1 x 10 B at 130#    Standing hip abduction    2 x 10 B - BTB around ankles   Standing hip extension    2 x 10 B - BTB around ankles                                 MANUAL THERAPY (0 minutes): Joint mobilization, soft tissue mobilization, and manipulation was utilized and necessary because of the patient's restricted joint motion and restricted motion of soft tissue. MODALITIES (0 minutes): Electrical Stimulation Therapy (IFC) was provided with intensity adjusted throughout treatment to patient tolerance. Hot Pack Therapy in order to provide analgesia and relieve muscle spasm. THERAPEUTIC ACTIVITY (0 minutes): Therapeutic activities per grid below to improve mobility, strength, balance and coordination. Required minimal visual, verbal and tactile cues to promote static and dynamic balance in stting & standing, promote coordination of bilateral UE & LE, and promote motor control of bilateral UE & LE.    NEUROMUSCULAR RE-EDUCATION (0 minutes): Exercise/activities per grid below to improve balance, coordination, posture and proprioception. Required minimal visual, verbal and tactile cues to promote static and dynamic balance in sitting & standing, promote coordination of bilateral UE & LE, and promote motor control of bilateral UE & LE.    HEP   Access Code: [email or web address]  URL: [email or web address]  Date: 10/27/2021  Prepared by: Omar Alvarez    Exercises  Supine Bridge - 1 x daily - 4 x weekly - 3 sets - 10 reps  Clamshell with Resistance - 1 x daily - 4 x weekly - 3 sets - 10 reps  Standing Heel Raise with Support - 1 x daily - 4 x weekly - 3 sets - 20 reps  Side Stepping with Resistance at Feet - 1 x daily - 5 x weekly - 3 sets - 10 reps  Leaning Against Wall Toe Raises - 1 x daily - 5 x weekly - 3 sets - 15-20 reps  Heel Raise on Step - 1 x daily - 5 x weekly - 3 sets - 15-20 reps  Hip Abduction with Resistance Band - 1 x daily - 4 x weekly - 3 sets - 10 reps  Hip Extension with Resistance Loop - 1 x daily - 4 x weekly - 3 sets - 10 reps  Single Leg Bridge - 1 x daily - 4 x weekly - 3 sets - 10 reps - 3 hold    [employer] Portal  Treatment/Session Summary:    · Response to Treatment: Patient tolerated therapy well today. Patient undergoing discharge assessment this morning. See other note for details. He demonstrates gains in balance and LE strength. PT provided patient with updated HEP handout and he verbalized and demonstrated understanding of instruction. · Communication/Consultation: PT emphasized the importance of performing HEP consistently. · Equipment provided: Grayson julio  · Recommendations/Intent for next treatment session: Next visit will focus on progressing to more challenging activities.     Total Treatment Billable Duration: 38 minutes   PT Patient Time In/Time Out  Time In: 0845  Time Out: 1812 Liyah Centeno PT, DPT    Visit Approval Visit # Therapist initials Date A NS / Cx < 24 hr >24 hr Cx Comments          RECERT DATE: [birth year]    1  10/27/21 [x]  [] [] Initial evaluation    2  11/1/21 [x] [] [] 1    3  11/4/21 [x] [] [] 2    4  11/8/21 [x] [] [] 3    5  11/11/21 [x] [] [] 4    6  11/15/21 [x] [] [] 5    7  11/18/21 [x] [] [] 6    8  11/29/21 [x] [] [] 7    9  12/2/21 [x] [] [] 8 - DC       [] [] []        [] [] []        [] [] []        [] [] []        [] [] []        [] [] []        [] [] []        [] [] []        [] [] []       Future Appointments   Date Time Provider Miko Awan   12/2/2021  8:45 AM Adrianna BURDICK, PT [facility]   12/6/2021  8:45 AM Soundra Osgood, PT [facility]   12/9/2021  8:45 AM MAGGI [facility]   4/14/2022  8:00 AM Luda Hernandez MD [facility]

## 2021-12-06 ENCOUNTER — APPOINTMENT (OUTPATIENT)
Dept: PHYSICAL THERAPY | Age: 76
End: 2021-12-06
Payer: MEDICARE

## 2021-12-09 ENCOUNTER — APPOINTMENT (OUTPATIENT)
Dept: PHYSICAL THERAPY | Age: 76
End: 2021-12-09
Payer: MEDICARE

## 2022-03-18 PROBLEM — J30.9 CHRONIC ALLERGIC RHINITIS: Status: ACTIVE | Noted: 2017-12-11

## 2022-03-19 PROBLEM — I10 ESSENTIAL HYPERTENSION: Status: ACTIVE | Noted: 2017-12-11

## 2022-03-19 PROBLEM — M15.9 PRIMARY OSTEOARTHRITIS INVOLVING MULTIPLE JOINTS: Status: ACTIVE | Noted: 2017-12-11

## 2022-03-19 PROBLEM — E78.00 HYPERCHOLESTEROLEMIA: Status: ACTIVE | Noted: 2017-12-11

## 2022-03-19 PROBLEM — M15.0 PRIMARY OSTEOARTHRITIS INVOLVING MULTIPLE JOINTS: Status: ACTIVE | Noted: 2017-12-11

## 2022-03-19 PROBLEM — E66.01 SEVERE OBESITY (HCC): Status: ACTIVE | Noted: 2017-12-11

## 2022-10-14 ENCOUNTER — OFFICE VISIT (OUTPATIENT)
Dept: FAMILY MEDICINE CLINIC | Facility: CLINIC | Age: 77
End: 2022-10-14
Payer: MEDICARE

## 2022-10-14 VITALS
HEIGHT: 68 IN | WEIGHT: 263 LBS | OXYGEN SATURATION: 94 % | DIASTOLIC BLOOD PRESSURE: 68 MMHG | SYSTOLIC BLOOD PRESSURE: 120 MMHG | BODY MASS INDEX: 39.86 KG/M2 | HEART RATE: 60 BPM

## 2022-10-14 DIAGNOSIS — M76.71 PERONEAL TENDONITIS, RIGHT: ICD-10-CM

## 2022-10-14 DIAGNOSIS — Z12.11 COLON CANCER SCREENING: ICD-10-CM

## 2022-10-14 DIAGNOSIS — I10 ESSENTIAL HYPERTENSION: Primary | ICD-10-CM

## 2022-10-14 DIAGNOSIS — R73.03 PREDIABETES: ICD-10-CM

## 2022-10-14 DIAGNOSIS — E66.01 SEVERE OBESITY (HCC): ICD-10-CM

## 2022-10-14 DIAGNOSIS — J30.9 CHRONIC ALLERGIC RHINITIS: ICD-10-CM

## 2022-10-14 DIAGNOSIS — M15.9 PRIMARY OSTEOARTHRITIS INVOLVING MULTIPLE JOINTS: ICD-10-CM

## 2022-10-14 DIAGNOSIS — E78.00 HYPERCHOLESTEROLEMIA: ICD-10-CM

## 2022-10-14 DIAGNOSIS — I10 ESSENTIAL HYPERTENSION: ICD-10-CM

## 2022-10-14 LAB
ANION GAP SERPL CALC-SCNC: 6 MMOL/L (ref 2–11)
BUN SERPL-MCNC: 12 MG/DL (ref 8–23)
CALCIUM SERPL-MCNC: 9.2 MG/DL (ref 8.3–10.4)
CHLORIDE SERPL-SCNC: 106 MMOL/L (ref 101–110)
CO2 SERPL-SCNC: 27 MMOL/L (ref 21–32)
CREAT SERPL-MCNC: 0.8 MG/DL (ref 0.8–1.5)
EST. AVERAGE GLUCOSE BLD GHB EST-MCNC: 134 MG/DL
GLUCOSE SERPL-MCNC: 108 MG/DL (ref 65–100)
HBA1C MFR BLD: 6.3 % (ref 4.8–5.6)
POTASSIUM SERPL-SCNC: 3.9 MMOL/L (ref 3.5–5.1)
SODIUM SERPL-SCNC: 139 MMOL/L (ref 133–143)

## 2022-10-14 PROCEDURE — 1123F ACP DISCUSS/DSCN MKR DOCD: CPT | Performed by: FAMILY MEDICINE

## 2022-10-14 PROCEDURE — 3017F COLORECTAL CA SCREEN DOC REV: CPT | Performed by: FAMILY MEDICINE

## 2022-10-14 PROCEDURE — G8484 FLU IMMUNIZE NO ADMIN: HCPCS | Performed by: FAMILY MEDICINE

## 2022-10-14 PROCEDURE — 99214 OFFICE O/P EST MOD 30 MIN: CPT | Performed by: FAMILY MEDICINE

## 2022-10-14 PROCEDURE — 1036F TOBACCO NON-USER: CPT | Performed by: FAMILY MEDICINE

## 2022-10-14 PROCEDURE — G8427 DOCREV CUR MEDS BY ELIG CLIN: HCPCS | Performed by: FAMILY MEDICINE

## 2022-10-14 PROCEDURE — G8417 CALC BMI ABV UP PARAM F/U: HCPCS | Performed by: FAMILY MEDICINE

## 2022-10-14 RX ORDER — HYDROCHLOROTHIAZIDE 12.5 MG/1
12.5 TABLET ORAL DAILY
Qty: 90 TABLET | Refills: 2 | Status: SHIPPED | OUTPATIENT
Start: 2022-10-14

## 2022-10-14 RX ORDER — DORZOLAMIDE HYDROCHLORIDE AND TIMOLOL MALEATE 20; 5 MG/ML; MG/ML
SOLUTION/ DROPS OPHTHALMIC
COMMUNITY
Start: 2022-09-20

## 2022-10-14 RX ORDER — METOPROLOL SUCCINATE 50 MG/1
50 TABLET, EXTENDED RELEASE ORAL DAILY
Qty: 90 TABLET | Refills: 2 | Status: SHIPPED | OUTPATIENT
Start: 2022-10-14

## 2022-10-14 RX ORDER — FLUTICASONE PROPIONATE 50 MCG
1 SPRAY, SUSPENSION (ML) NASAL DAILY
Qty: 16 G | Refills: 5 | Status: SHIPPED | OUTPATIENT
Start: 2022-10-14

## 2022-10-14 RX ORDER — LOSARTAN POTASSIUM 100 MG/1
100 TABLET ORAL DAILY
Qty: 90 TABLET | Refills: 2 | Status: SHIPPED | OUTPATIENT
Start: 2022-10-14

## 2022-10-14 RX ORDER — SIMVASTATIN 40 MG
40 TABLET ORAL NIGHTLY
Qty: 90 TABLET | Refills: 2 | Status: SHIPPED | OUTPATIENT
Start: 2022-10-14

## 2022-10-14 RX ORDER — BIMATOPROST 0.3 MG/ML
SOLUTION/ DROPS OPHTHALMIC
COMMUNITY
Start: 2022-09-20

## 2022-10-14 ASSESSMENT — ENCOUNTER SYMPTOMS
DIARRHEA: 0
WHEEZING: 0
CHEST TIGHTNESS: 0
NAUSEA: 0
SHORTNESS OF BREATH: 0
CONSTIPATION: 0

## 2022-10-14 ASSESSMENT — PATIENT HEALTH QUESTIONNAIRE - PHQ9
SUM OF ALL RESPONSES TO PHQ QUESTIONS 1-9: 0
SUM OF ALL RESPONSES TO PHQ9 QUESTIONS 1 & 2: 0
SUM OF ALL RESPONSES TO PHQ QUESTIONS 1-9: 0
1. LITTLE INTEREST OR PLEASURE IN DOING THINGS: 0
2. FEELING DOWN, DEPRESSED OR HOPELESS: 0

## 2022-10-14 NOTE — PATIENT INSTRUCTIONS
Patient Education        A Healthy Lifestyle: Care Instructions  A healthy lifestyle can help you feel good, have more energy, and stay at a weight that's healthy for you. You can share a healthy lifestyle with your friends and family. And you can do it on your own. Eat meals with your friends or family. You could try cooking together. Plan activities with other people. Go for a walk with a friend, try a free online fitness class, or join a sports league. Eat a variety of healthy foods. These include fruits, vegetables, whole grains, low-fat dairy, and lean protein. Choose healthy portions of food. You can use the Nutrition Facts label on food packages as a guide. Eat more fruits and vegetables. You could add vegetables to sandwiches or add fruit to cereal.  Drink water when you are thirsty. Limit soda, juice, and sports drinks. Try to exercise most days. Aim for at least 2½ hours of exercise each week. Keep moving. Work in the garden or take your dog on a walk. Use the stairs instead of the elevator. If you use tobacco or nicotine, try to quit. Ask your doctor about programs and medicines to help you quit. Limit alcohol. Men should have no more than 2 drinks a day. Women should have no more than 1. For some people, no alcohol is the best choice. Follow-up care is a key part of your treatment and safety. Be sure to make and go to all appointments, and call your doctor if you are having problems. It's also a good idea to know your test results and keep a list of the medicines you take. Where can you learn more? Go to [email or web address]://[email or web address].[email or web address]. org and sign in to your [employer] account. Enter X941 in the IdentiGEN box to learn more about \"A Healthy Lifestyle: Care Instructions. \"     If you do not have an account, please click on the \"Sign Up Now\" link. Current as of: March 9, 2022               Content Version: 13.4  © 8625-8472 Healthwise, Incorporated.    Care instructions adapted under license by [facility] ([facility]). If you have questions about a medical condition or this instruction, always ask your healthcare professional. [address] any warranty or liability for your use of this information.

## 2022-10-14 NOTE — ASSESSMENT & PLAN NOTE
Blood pressure is at goal for age. Encouraged continued medication compliance, DASH or Mediterranean diet and daily physical activity.

## 2022-10-14 NOTE — ASSESSMENT & PLAN NOTE
Patient is taking statin without side effect. Discussed benefits of statin in prevention of coronary arterty and cerebrovascular disease. Last LDL was 95.

## 2022-10-14 NOTE — PROGRESS NOTES
Kulwinder Jaquez is a 78 y.o. male who presents today for the following:  Chief Complaint   Patient presents with    Medication Refill       No Known Allergies    Current Outpatient Medications   Medication Sig Dispense Refill    diclofenac sodium (VOLTAREN) 1 % GEL APPLY 2GRAMS TO AFFECTED AREA 2 TIMES A DAY AS NEEDED      fluticasone (FLONASE) 50 MCG/ACT nasal spray 1 spray by Nasal route daily      hydroCHLOROthiazide (HYDRODIURIL) 12.5 MG tablet Take 12.5 mg by mouth daily      losartan (COZAAR) 100 MG tablet Take 100 mg by mouth daily      loteprednol (LOTEMAX) 0.5 % ophthalmic suspension Apply 1 drop to eye as needed      metoprolol succinate (TOPROL XL) 50 MG extended release tablet Take 50 mg by mouth daily      simvastatin (ZOCOR) 40 MG tablet Take 40 mg by mouth       No current facility-administered medications for this visit. Past Medical History:   Diagnosis Date    Arthritis     Cancer ([facility] [place] [address]     Hx of skin cancer    Chronic pain     GERD (gastroesophageal reflux disease)     Headache     Hypercholesterolemia     Hypertension        Past Surgical History:   Procedure Laterality Date    TONSILLECTOMY         Social History     Tobacco Use    Smoking status: Never    Smokeless tobacco: Never   Substance Use Topics    Alcohol use: Yes        Family History   Problem Relation Age of Onset    Cancer Mother         bile duct    Heart Attack Father     Cancer Maternal Grandfather     Cancer Paternal Grandfather        Patient is a 72-year-old gentleman here today for routine follow-up. Patient has a history of high blood pressure, osteoarthritis, hyperlipidemia, prediabetes, and obesity. Patient had COVID vaccines times 3. Flu vaccine at pharmacy this fall  Pneumonia: Completed PNA vaccine course  Shingles vaccine: completed at pharmacy. 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 Patient has acute concern today regarding right lateral ankle pain.   Has been present for one month.  Pain is nagging. Review of Systems   Constitutional:  Negative for appetite change, fatigue and unexpected weight change. Respiratory:  Negative for chest tightness, shortness of breath and wheezing. Cardiovascular:  Negative for chest pain and palpitations. Gastrointestinal:  Negative for constipation, diarrhea and nausea. Musculoskeletal:         Right ankle pain   Psychiatric/Behavioral:  The patient is not nervous/anxious. /68   Pulse 60   Ht 5' 8\" (1.727 m)   Wt 263 lb (119.3 kg)   SpO2 94%   BMI 39.99 kg/m²     Physical Exam  Vitals reviewed. Constitutional:       General: He is not in acute distress. Appearance: Normal appearance. He is obese. He is not ill-appearing. Eyes:      General: No scleral icterus. Conjunctiva/sclera: Conjunctivae normal.   Cardiovascular:      Rate and Rhythm: Normal rate and regular rhythm. Heart sounds: Normal heart sounds. No murmur heard. Pulmonary:      Effort: Pulmonary effort is normal. No respiratory distress. Breath sounds: Normal breath sounds. No wheezing. Musculoskeletal:      Cervical back: Neck supple. Right lower leg: No edema. Left lower leg: No edema. Comments: Pain at base of fifth metatarsal.  Pain with internal rotation and inversion of ankle. Skin:     Findings: No rash. Neurological:      General: No focal deficit present. Mental Status: He is oriented to person, place, and time. Psychiatric:         Behavior: Behavior normal.         Thought Content: Thought content normal.        1. Essential hypertension  Assessment & Plan:  Blood pressure is at goal for age. Encouraged continued medication compliance, DASH or Mediterranean diet and daily physical activity. Orders:  -     Basic Metabolic Panel; Future  -     metoprolol succinate (TOPROL XL) 50 MG extended release tablet;  Take 1 tablet by mouth daily, Disp-90 tablet, R-2Normal  -     losartan (COZAAR) 100 MG tablet; Take 1 tablet by mouth daily, Disp-90 tablet, R-2Normal  -     hydroCHLOROthiazide (HYDRODIURIL) 12.5 MG tablet; Take 1 tablet by mouth daily, Disp-90 tablet, R-2Normal  2. Chronic allergic rhinitis  -     fluticasone (FLONASE) 50 MCG/ACT nasal spray; 1 spray by Nasal route daily, Disp-16 g, R-5Normal  3. Primary osteoarthritis involving multiple joints  4. Severe obesity ([address]  Assessment & Plan:   Encouraged Mediterranean diet and daily physical activity. 5. Prediabetes  -     Hemoglobin A1C; Future  6. Colon cancer screening  -     POCT FECAL IMMUNOCHEMICAL TEST (FIT) ()  7. Hypercholesterolemia  Assessment & Plan:  Patient is taking statin without side effect. Discussed benefits of statin in prevention of coronary arterty and cerebrovascular disease. Last LDL was 95. Orders:  -     simvastatin (ZOCOR) 40 MG tablet; Take 1 tablet by mouth nightly, Disp-90 tablet, R-2Normal  8. Peroneal tendonitis, right   Recommend RICE therapy. Given foot and ankle home conditioning program to complete. Patient informed, we will call with blood work results within one week. If you have not heard regarding results in over a week, please contact office. You can also review results on [employer].            Jacob Huang MD

## 2022-10-26 ENCOUNTER — TELEPHONE (OUTPATIENT)
Dept: FAMILY MEDICINE CLINIC | Facility: CLINIC | Age: 77
End: 2022-10-26

## 2022-10-26 NOTE — TELEPHONE ENCOUNTER
Test was not completed correctly. Instructed pt to come in and  another sample with further instructions on how to complete.

## 2022-10-26 NOTE — TELEPHONE ENCOUNTER
----- Message from Zafar Poe sent at 10/26/2022  8:46 AM EDT -----  Subject: Results Request    QUESTIONS  Results: Fit test; Ordered by:  Bryan Portillo   Date Performed: 2022-10-20  ---------------------------------------------------------------------------  --------------  Jo Fuentes INFO    [phone]; OK to leave message on voicemail  ---------------------------------------------------------------------------  --------------

## 2023-03-31 ENCOUNTER — TELEPHONE (OUTPATIENT)
Dept: FAMILY MEDICINE CLINIC | Facility: CLINIC | Age: 78
End: 2023-03-31

## 2023-03-31 NOTE — TELEPHONE ENCOUNTER
Please triage and discuss with Clarisse Okeefe. Nurse practitioner Tristin Mina with [facility] performed an evaluation on the patient today 3/31 and stated he had taken his blood pressure medicine but did not know what time he took it. She stated his BP was 175/119 sitting and 187/114 standing. Please advise next steps for patient. Also please call Tristin Mina at [phone] to give her an update after Clarisse Okeefe reviews.  Thanks

## 2023-03-31 NOTE — TELEPHONE ENCOUNTER
Patient returned call. He states that he took his Losartan 100mg & HCTZ 12.5mg this morning. He doesn't take his Metoprolol Succinate 50mg until the evening. States that he took the Losartan & HCTZ ~2 hours before being seen by the NP for [employer]. He states that he checked his BP once he got home & it was 140/85 in his L arm & 135/84 in his R arm. Denies headache or any vision issues. States that he just got home from the [facility], where he did group exercise with no issue. His next appt w/ Dr. Dina Washburn is scheduled for 4/18/23.

## 2023-03-31 NOTE — TELEPHONE ENCOUNTER
Called emergency contact & brother, Radha Buck, who states that the number on file for patient is his only number. He states that the patient works outside a lot & often goes to the [facility] center for activities & exercise. He will call patient & get him to give our office a call back.

## 2023-03-31 NOTE — TELEPHONE ENCOUNTER
Called patient, no answer. [facility] requesting a return call to discuss. [facility] number was left on voicemail.

## 2023-04-03 NOTE — TELEPHONE ENCOUNTER
Returned call to Gareth Singh, notifying her of provider's response. She verbalized understanding & thanked.

## 2023-04-15 SDOH — ECONOMIC STABILITY: INCOME INSECURITY: HOW HARD IS IT FOR YOU TO PAY FOR THE VERY BASICS LIKE FOOD, HOUSING, MEDICAL CARE, AND HEATING?: NOT VERY HARD

## 2023-04-15 SDOH — HEALTH STABILITY: PHYSICAL HEALTH: ON AVERAGE, HOW MANY DAYS PER WEEK DO YOU ENGAGE IN MODERATE TO STRENUOUS EXERCISE (LIKE A BRISK WALK)?: 4 DAYS

## 2023-04-15 SDOH — ECONOMIC STABILITY: HOUSING INSECURITY
IN THE LAST 12 MONTHS, WAS THERE A TIME WHEN YOU DID NOT HAVE A STEADY PLACE TO SLEEP OR SLEPT IN A SHELTER (INCLUDING NOW)?: NO

## 2023-04-15 SDOH — ECONOMIC STABILITY: FOOD INSECURITY: WITHIN THE PAST 12 MONTHS, YOU WORRIED THAT YOUR FOOD WOULD RUN OUT BEFORE YOU GOT MONEY TO BUY MORE.: NEVER TRUE

## 2023-04-15 SDOH — ECONOMIC STABILITY: TRANSPORTATION INSECURITY
IN THE PAST 12 MONTHS, HAS LACK OF TRANSPORTATION KEPT YOU FROM MEETINGS, WORK, OR FROM GETTING THINGS NEEDED FOR DAILY LIVING?: NO

## 2023-04-15 SDOH — ECONOMIC STABILITY: FOOD INSECURITY: WITHIN THE PAST 12 MONTHS, THE FOOD YOU BOUGHT JUST DIDN'T LAST AND YOU DIDN'T HAVE MONEY TO GET MORE.: NEVER TRUE

## 2023-04-15 SDOH — HEALTH STABILITY: PHYSICAL HEALTH: ON AVERAGE, HOW MANY MINUTES DO YOU ENGAGE IN EXERCISE AT THIS LEVEL?: 60 MIN

## 2023-04-15 ASSESSMENT — PATIENT HEALTH QUESTIONNAIRE - PHQ9
SUM OF ALL RESPONSES TO PHQ QUESTIONS 1-9: 1
1. LITTLE INTEREST OR PLEASURE IN DOING THINGS: 1
2. FEELING DOWN, DEPRESSED OR HOPELESS: 0
SUM OF ALL RESPONSES TO PHQ9 QUESTIONS 1 & 2: 1

## 2023-04-15 ASSESSMENT — LIFESTYLE VARIABLES
HOW MANY STANDARD DRINKS CONTAINING ALCOHOL DO YOU HAVE ON A TYPICAL DAY: 98
HOW OFTEN DO YOU HAVE A DRINK CONTAINING ALCOHOL: MONTHLY OR LESS
HOW MANY STANDARD DRINKS CONTAINING ALCOHOL DO YOU HAVE ON A TYPICAL DAY: PATIENT DECLINED
HOW OFTEN DO YOU HAVE A DRINK CONTAINING ALCOHOL: 2
HOW OFTEN DO YOU HAVE SIX OR MORE DRINKS ON ONE OCCASION: 1

## 2023-04-18 ENCOUNTER — OFFICE VISIT (OUTPATIENT)
Dept: FAMILY MEDICINE CLINIC | Facility: CLINIC | Age: 78
End: 2023-04-18
Payer: COMMERCIAL

## 2023-04-18 VITALS
DIASTOLIC BLOOD PRESSURE: 78 MMHG | SYSTOLIC BLOOD PRESSURE: 126 MMHG | HEIGHT: 68 IN | OXYGEN SATURATION: 96 % | HEART RATE: 60 BPM | BODY MASS INDEX: 40.16 KG/M2 | WEIGHT: 265 LBS

## 2023-04-18 DIAGNOSIS — E66.01 SEVERE OBESITY (HCC): ICD-10-CM

## 2023-04-18 DIAGNOSIS — E78.00 HYPERCHOLESTEROLEMIA: ICD-10-CM

## 2023-04-18 DIAGNOSIS — R73.03 PREDIABETES: ICD-10-CM

## 2023-04-18 DIAGNOSIS — J30.9 CHRONIC ALLERGIC RHINITIS: ICD-10-CM

## 2023-04-18 DIAGNOSIS — I10 ESSENTIAL HYPERTENSION: ICD-10-CM

## 2023-04-18 DIAGNOSIS — M15.9 PRIMARY OSTEOARTHRITIS INVOLVING MULTIPLE JOINTS: ICD-10-CM

## 2023-04-18 DIAGNOSIS — D64.9 ANEMIA, UNSPECIFIED TYPE: ICD-10-CM

## 2023-04-18 DIAGNOSIS — Z00.00 MEDICARE ANNUAL WELLNESS VISIT, SUBSEQUENT: Primary | ICD-10-CM

## 2023-04-18 LAB — HBA1C MFR BLD: 6.2 %

## 2023-04-18 PROCEDURE — 3078F DIAST BP <80 MM HG: CPT | Performed by: FAMILY MEDICINE

## 2023-04-18 PROCEDURE — 1123F ACP DISCUSS/DSCN MKR DOCD: CPT | Performed by: FAMILY MEDICINE

## 2023-04-18 PROCEDURE — G0439 PPPS, SUBSEQ VISIT: HCPCS | Performed by: FAMILY MEDICINE

## 2023-04-18 PROCEDURE — 3074F SYST BP LT 130 MM HG: CPT | Performed by: FAMILY MEDICINE

## 2023-04-18 PROCEDURE — 83036 HEMOGLOBIN GLYCOSYLATED A1C: CPT | Performed by: FAMILY MEDICINE

## 2023-04-18 RX ORDER — SIMVASTATIN 40 MG
40 TABLET ORAL NIGHTLY
Qty: 90 TABLET | Refills: 2 | Status: SHIPPED | OUTPATIENT
Start: 2023-04-18

## 2023-04-18 RX ORDER — LOSARTAN POTASSIUM 100 MG/1
100 TABLET ORAL DAILY
Qty: 90 TABLET | Refills: 2 | Status: SHIPPED | OUTPATIENT
Start: 2023-04-18

## 2023-04-18 RX ORDER — HYDROCHLOROTHIAZIDE 25 MG/1
25 TABLET ORAL DAILY
Qty: 90 TABLET | Refills: 2 | Status: SHIPPED | OUTPATIENT
Start: 2023-04-18

## 2023-04-18 RX ORDER — METOPROLOL SUCCINATE 50 MG/1
50 TABLET, EXTENDED RELEASE ORAL DAILY
Qty: 90 TABLET | Refills: 2 | Status: SHIPPED | OUTPATIENT
Start: 2023-04-18

## 2023-04-18 RX ORDER — FLUTICASONE PROPIONATE 50 MCG
1 SPRAY, SUSPENSION (ML) NASAL DAILY
Qty: 16 G | Refills: 5 | Status: SHIPPED | OUTPATIENT
Start: 2023-04-18

## 2023-04-18 ASSESSMENT — ENCOUNTER SYMPTOMS
CHEST TIGHTNESS: 0
NAUSEA: 0
CONSTIPATION: 0
DIARRHEA: 0
WHEEZING: 0
SHORTNESS OF BREATH: 0

## 2023-04-18 NOTE — ASSESSMENT & PLAN NOTE
Mildly elevated at home. Increased HCTZ to 25 mg. Encouraged continued medication compliance, DASH or Mediterranean diet and daily physical activity.

## 2023-04-18 NOTE — PROGRESS NOTES
Negative for dizziness and light-headedness. Psychiatric/Behavioral:  The patient is not nervous/anxious. /78   Pulse 60   Ht 5' 8\" (1.727 m)   Wt 265 lb (120.2 kg)   SpO2 96%   BMI 40.29 kg/m²     Physical Exam  Vitals reviewed. Constitutional:       General: He is not in acute distress. Appearance: Normal appearance. He is obese. He is not ill-appearing. Eyes:      General: No scleral icterus. Conjunctiva/sclera: Conjunctivae normal.   Cardiovascular:      Rate and Rhythm: Normal rate and regular rhythm. Heart sounds: Normal heart sounds. No murmur heard. Pulmonary:      Effort: Pulmonary effort is normal. No respiratory distress. Breath sounds: Normal breath sounds. No wheezing. Musculoskeletal:      Cervical back: Neck supple. Right lower leg: No edema. Left lower leg: No edema. Skin:     Findings: Lesion (multiple actinic keratosis on arm) present. No rash. Neurological:      General: No focal deficit present. Mental Status: He is alert and oriented to person, place, and time. Psychiatric:         Behavior: Behavior normal.         Thought Content: Thought content normal.          Hemoglobin A1C, POC   Date Value Ref Range Status   04/18/2023 6.2 % Final   04/14/2022 5.9 % Final   10/14/2021 6.1 % Final         2. Essential hypertension  Assessment & Plan:  Mildly elevated at home. Increased HCTZ to 25 mg. Encouraged continued medication compliance, DASH or Mediterranean diet and daily physical activity. Orders:  -     Comprehensive Metabolic Panel; Future  -     CBC with Auto Differential; Future  -     Microalbumin / Creatinine Urine Ratio; Future  -     TSH with Reflex; Future  -     metoprolol succinate (TOPROL XL) 50 MG extended release tablet; Take 1 tablet by mouth daily, Disp-90 tablet, R-2Normal  -     hydroCHLOROthiazide (HYDRODIURIL) 25 MG tablet;  Take 1 tablet by mouth daily, Disp-90 tablet, R-2Normal  -     losartan (COZAAR) 100

## 2023-04-18 NOTE — PATIENT INSTRUCTIONS
lifestyle, illnesses that may run in your family, and various assessments and screenings as appropriate. After reviewing your medical record and screening and assessments performed today your provider may have ordered immunizations, labs, imaging, and/or referrals for you. A list of these orders (if applicable) as well as your Preventive Care list are included within your After Visit Summary for your review. Other Preventive Recommendations:    A preventive eye exam performed by an eye specialist is recommended every 1-2 years to screen for glaucoma; cataracts, macular degeneration, and other eye disorders. A preventive dental visit is recommended every 6 months. Try to get at least 150 minutes of exercise per week or 10,000 steps per day on a pedometer . Order or download the FREE \"Exercise & Physical Activity: Your Everyday Guide\" from [employer]. Call 1-225.933.4805 or search [employer] online. You need 3441-1775 mg of calcium and 1950-9255 IU of vitamin D per day. It is possible to meet your calcium requirement with diet alone, but a vitamin D supplement is usually necessary to meet this goal.  When exposed to the sun, use a sunscreen that protects against both UVA and UVB radiation with an SPF of 30 or greater. Reapply every 2 to 3 hours or after sweating, drying off with a towel, or swimming. Always wear a seat belt when traveling in a car. Always wear a helmet when riding a bicycle or motorcycle.

## 2023-04-25 ENCOUNTER — TELEPHONE (OUTPATIENT)
Dept: FAMILY MEDICINE CLINIC | Facility: CLINIC | Age: 78
End: 2023-04-25

## 2023-04-25 NOTE — TELEPHONE ENCOUNTER
Called patient, no answer. [facility] notifying of Dr. Temi Estrada response. [address] [facility] number was left on voicemail in case there are any further questions.

## 2023-04-25 NOTE — TELEPHONE ENCOUNTER
1  Please see the post cardiac catheterization/ angioseal closure device instructions and stent booklet  No heavy lifting, greater than 10 lbs  or strenuous  activity for 48 hrs  See the post cardiac catheterization/ angioseal closure device instructions  2 Remove band aid tomorrow  Shower and wash area- groin gently with soap and water- beginning tomorrow  Rinse and pat dry  Apply new water seal band aid  Repeat this process for 5 days  No powders, creams lotions or antibiotic ointments  for 5 days  No tub baths, hot tubs or swimming for 5 days  3 No driving for 2 days    4  Do not stop aspirin or plavix (clopidigrel) for any reason without a cardiologists consent, or the stent could block up and cause a heart attack  Coronary Intravascular Stent Placement   AMBULATORY CARE:   What you need to know about coronary intravascular stent placement:  Coronary intravascular stent placement is a procedure to place a stent in an artery of your heart that has plaque buildup  Plaque is a mixture of fat and cholesterol  A stent is a small mesh tube made of metal that helps keep your artery open  Your healthcare provider may place a bare metal stent or a drug-eluting stent (AMANDA) in your artery  A AMANDA is coated with medicine that is slowly released and helps prevent more plaque buildup in the area where the stent is placed  The stent remains in your artery for life  You may need more than one stent  How to prepare for your procedure:   · Your healthcare provider will talk to you about how to prepare for your procedure  He may tell you not to eat or drink anything after midnight on the day of your procedure  He will tell you what medicines to take or not take on the day of your procedure  Arrange to have someone drive you home when you leave the hospital      · You may need blood tests and a stress test before your procedure  Talk to your healthcare provider about these or other tests you may need  Spoke to patient, notifying him of lab results & Dr. Arias Morris response. Patient states that he donated blood 3 days before his visit, so he is wanting to know if labs need to be repeated since this was the likely cause? Informed that I will discuss w/ . [facility] [place] & return his call. Contrast liquid will be used during your procedure to help healthcare providers see your heart better  Tell the healthcare provider if you have ever had an allergic reaction to contrast liquid  What will happen during the procedure:   · You may be given an antibiotic through your IV to help prevent a bacterial infection  You will be given medicine in your IV to help you relax or make you drowsy  You will also get local anesthesia that will numb the area where the catheter will be placed  You will be awake during the procedure so healthcare providers can give you instructions  You may be asked to cough, hold your breath, or to tell them how you feel during the procedure  · A catheter (long, thin tube) will be put into an artery in your wrist, groin, or neck  The catheter will be gently guided through this artery to your heart and into the narrowed or blocked artery  Healthcare providers will use x-rays and contrast liquid to find the area where the stent needs to be placed  You may feel warm as the contrast liquid is put into the catheter  This feeling should go away quickly  · A guidewire will then be placed into the catheter  The balloon catheter will be guided into the narrowed or blocked artery using the guidewire  Healthcare providers will inflate the balloon several times for short periods  The inflated balloon pushes the plaque against the artery walls  This opens them and allows more blood flow to your heart  Another balloon catheter with a stent is then inserted into the artery  The balloon is inflated  This expands the stent and pushes it into place against the artery wall  The stent will be left in your artery to help keep it open  What will happen after the procedure:   · The catheter and guidewire will be taken out of the artery and a pressure bandage will be put on the area  Your healthcare provider may apply a collagen plug or other closure device to stop the bleeding   Healthcare providers will put pressure on the bandaged area to help stop the bleeding  · You will be taken to a room to rest  Healthcare providers will monitor you closely for any problems  You will then be taken to your hospital room  You may need to lie still and flat for 3 to 6 hours after the procedure to prevent bleeding  Do not get out of bed until your healthcare provider says it is okay  Risks of the procedure:   · You may develop a hematoma (swelling caused by collection of blood) or bleed more than expected from your catheter site  The contrast liquid used during this procedure may cause an allergic reaction or kidney problems  You may develop an infection  · Your artery may be injured when the catheter is inserted  During or after your procedure, blood clots may form, or plaque may break off  The blood clot or plaque may block your artery and cause a heart attack or stroke  The stent could collapse, or a clot could form on the stent  This could cause the artery to become blocked again  You may need another procedure to open your artery  Call 911 for any of the following:   · You have any of the following signs of a heart attack:      ¨ Squeezing, pressure, or pain in your chest that lasts longer than 5 minutes or returns    ¨ Discomfort or pain in your back, neck, jaw, stomach, or arm     ¨ Trouble breathing    ¨ Nausea or vomiting    ¨ Lightheadedness or a sudden cold sweat, especially with chest pain or trouble breathing    · You have any of the following signs of a stroke:      ¨ Numbness or drooping on one side of your face     ¨ Weakness in an arm or leg    ¨ Confusion or difficulty speaking    ¨ Dizziness, a severe headache, or vision loss  Seek care immediately if:   · Your arm or leg feels warm, tender, and painful  It may look swollen and red  · Your leg or arm becomes numb or turns white or blue      · The area where the catheter was placed is swollen, red, or has pus or foul-smelling fluid coming from it      · You start to bleed from your catheter site again  Contact your cardiologist if:   · You have a fever or chills  · You have questions or concerns about your condition or care  Medicine:   · Medicines  may be given to prevent blood clots around your stent, lower your blood pressure, and decrease cholesterol in your blood  You may also be given medicine to relieve chest pain  · Take your medicine as directed  Contact your healthcare provider if you think your medicine is not helping or if you have side effects  Tell him or her if you are allergic to any medicine  Keep a list of the medicines, vitamins, and herbs you take  Include the amounts, and when and why you take them  Bring the list or the pill bottles to follow-up visits  Carry your medicine list with you in case of an emergency  Activity:   · Avoid unnecessary stair climbing for 48 hours, if a catheter was put in your groin  · Do not place pressure on your arm, hand, or wrist, if the catheter was placed in your wrist  Avoid pushing, pulling, or heavy lifting with that arm  · If you need to cough, support the area where the catheter was inserted with your hand  · Ask your cardiologist how long you need to limit movement and avoid certain activities  · You may feel like resting more after your procedure  Slowly start to do more each day  Rest when you feel it is needed  Wound care:  Ask your cardiologist about how to care for your incision wound  Ask when you can get into a tub, shower, or pool  Do not smoke:  Nicotine and other chemicals in cigarettes and cigars can cause heart and lung damage  Ask your healthcare provider for information if you currently smoke and need help to quit  E-cigarettes or smokeless tobacco still contain nicotine  Talk to your healthcare provider before you use these products  Cardiac rehab:  Your cardiologist may recommend that you attend cardiac rehabilitation (rehab)   This is a program run by specialists who will help you safely strengthen your heart and reduce the risk for more heart disease  The plan includes exercise, relaxation, stress management, and heart-healthy nutrition  Healthcare providers will also check to make sure any medicines you are taking are working  Follow up with your cardiologist as directed: If you need an MRI, wait at least 6 to 8 weeks after stent placement, or as directed  Write down your questions so you remember to ask them during your visits  © 2017 [employer]  Information is for End User's use only and may not be sold, redistributed or otherwise used for commercial purposes  All illustrations and images included in CareNotes® are the copyrighted property of [employer]  or [employer]  The above information is an  only  It is not intended as medical advice for individual conditions or treatments  Talk to your doctor, nurse or pharmacist before following any medical regimen to see if it is safe and effective for you  Myocardial Infarction   WHAT YOU NEED TO KNOW:   A myocardial infarction (MI) is a heart attack  A heart attack happens when the blood vessels that supply blood to your heart (coronary arteries) are blocked  This can damage your heart  It can lead to an abnormal heart rhythm, heart failure, or may become life-threatening  DISCHARGE INSTRUCTIONS:   Medicines: You may  need any of the following:  · Heart medicines  help decrease blood pressure, control your heart rate, and help your heart function better  · Nitroglycerin  opens the arteries to your heart, increases oxygen levels, and can decrease chest pain  You may get your nitroglycerin as a pill, a patch, or a paste  Ask your healthcare provider or cardiologist how to safely take this medicine  · Aspirin  helps prevent clots from forming and causing blood flow problems   If healthcare providers want you to take aspirin daily, do not take acetaminophen or ibuprofen instead  Do not take more or less aspirin than healthcare providers say to take  If you are on another blood thinner medicine, ask your healthcare provider or cardiologist before you take aspirin for any reason  · Blood thinners    help prevent blood clots  Examples of blood thinners include heparin and warfarin  Clots can cause strokes, heart attacks, and death  The following are general safety guidelines to follow while you are taking a blood thinner:    ¨ Watch for bleeding and bruising while you take blood thinners  Watch for bleeding from your gums or nose  Watch for blood in your urine and bowel movements  Use a soft washcloth on your skin, and a soft toothbrush to brush your teeth  This can keep your skin and gums from bleeding  If you shave, use an electric shaver  Do not play contact sports  ¨ Tell your dentist and other healthcare providers that you take anticoagulants  Wear a bracelet or necklace that says you take this medicine  ¨ Do not start or stop any medicines unless your healthcare provider tells you to  Many medicines cannot be used with blood thinners  ¨ Tell your healthcare provider right away if you forget to take the medicine, or if you take too much  ¨ Warfarin  is a blood thinner that you may need to take  The following are things you should be aware of if you take warfarin  § Foods and medicines can affect the amount of warfarin in your blood  Do not make major changes to your diet while you take warfarin  Warfarin works best when you eat about the same amount of vitamin K every day  Vitamin K is found in green leafy vegetables and certain other foods  Ask for more information about what to eat when you are taking warfarin  § You will need to see your healthcare provider for follow-up visits when you are on warfarin  You will need regular blood tests  These tests are used to decide how much medicine you need      · Cholesterol medicine decreases cholesterol and the amount of plaque in your blood  · Do not take certain medicines without asking your healthcare provider first   These include NSAIDs, herbal or vitamin supplements, or hormones (estrogen or progestin)  · Take your medicine as directed  Contact your healthcare provider if you think your medicine is not helping or if you have side effects  Tell him or her if you are allergic to any medicine  Keep a list of the medicines, vitamins, and herbs you take  Include the amounts, and when and why you take them  Bring the list or the pill bottles to follow-up visits  Carry your medicine list with you in case of an emergency  Cardiac rehabilitation (rehab)  is a program run by specialists who will help you safely strengthen your heart and prevent more heart disease  The plan includes exercise, relaxation, stress management, and heart-healthy nutrition  Healthcare providers will also check to make sure any medicines you take are working  The plan may also include instructions for when you can drive, return to work, and do other normal daily activities  Follow up with your healthcare provider or cardiologist within 14 days or as directed:  Ask for information about continuing care, treatments, and home services  Write down your questions so you remember to ask them during your visits  Lifestyle changes:   · Go to cardiac rehabilitation as directed  This is a program run by specialists who will help you safely strengthen your heart and prevent more heart disease  This plan includes exercise, relaxation, stress management, and heart-healthy nutrition  Healthcare providers will also check to make sure any medicines you are taking are working  The plan may also include instructions for when you can drive, return to work, and do other normal daily activities  · Eat a heart healthy diet    Get enough calories, protein, vitamins, and minerals to help prevent poor nutrition and promote muscle strength  You may be told to eat foods low in cholesterol or sodium (salt)  You also may be told to limit saturated and trans fats  Eat foods that contain healthy fats, such as walnuts, salmon, and canola and soybean oils  Eat foods that help protect the heart, including plenty of fruits and vegetables, nuts, and sources of fiber  · Do not smoke  If you smoke, it is never too late to quit  Smoking increases your risk of another MI      · Exercise  Ask your healthcare provider or cardiologist about the best exercise plan for you  Exercise makes your heart stronger, lowers blood pressure, and helps prevent an MI  The goal is 30 to 60 minutes a day, 5 to 7 days a week  Ask your healthcare provider or cardiologist how often and how long to exercise  · Maintain a healthy weight  Ask your healthcare provider or cardiologist how much you should weigh  Ask him to help you create a weight loss plan if you are overweight  · Manage your stress  Stress may slow healing and lead to illness  Learn ways to control stress, such as relaxation, deep breathing, and music  Talk to someone about things that upset you  · Get a flu vaccine  every year as soon as it is available  The vaccine will help prevent the flu  Ask about other vaccinations you may need  Contact your healthcare provider or cardiologist if:   · You have trouble taking your heart medicine  · You have questions or concerns about your condition or care  Seek care immediately or call 911 if:   · You have any of the following signs of a heart attack:      ¨ Squeezing, pressure, or pain in your chest that lasts longer than 5 minutes or returns    ¨ Discomfort or pain in your back, neck, jaw, stomach, or arm     ¨ Trouble breathing    ¨ Nausea or vomiting    ¨ Lightheadedness or a sudden cold sweat, especially with chest pain or trouble breathing    · You are tired and cannot think clearly       · Your heart is beating faster than usual  · You are bleeding from your gums or nose  · You see blood in your urine or bowel movements  · You urinate less than usual or not at all  · You have new or increased swelling in your feet or ankles  © 2017 [employer] Information is for End User's use only and may not be sold, redistributed or otherwise used for commercial purposes  All illustrations and images included in CareNotes® are the copyrighted property of [employer] Inc  or Babak Deshpande  The above information is an  only  It is not intended as medical advice for individual conditions or treatments  Talk to your doctor, nurse or pharmacist before following any medical regimen to see if it is safe and effective for you

## 2023-04-25 NOTE — TELEPHONE ENCOUNTER
----- Message from Hernan Madrigal MD sent at 4/19/2023  9:21 PM EDT -----  Lab work mostly looks great  -normal kidney, liver and thyroid function  -cholesterol levels look great  -no elevation of protein in urine  -mild anemia which is new. I would like to repeat CBC in a few weeks (4 weeks) to make sure not continuing to decrease. Please schedule lab appointment.

## 2023-10-19 ENCOUNTER — OFFICE VISIT (OUTPATIENT)
Dept: FAMILY MEDICINE CLINIC | Facility: CLINIC | Age: 78
End: 2023-10-19
Payer: MEDICARE

## 2023-10-19 VITALS
TEMPERATURE: 98 F | HEART RATE: 60 BPM | HEIGHT: 68 IN | DIASTOLIC BLOOD PRESSURE: 88 MMHG | OXYGEN SATURATION: 96 % | SYSTOLIC BLOOD PRESSURE: 135 MMHG | WEIGHT: 261 LBS | BODY MASS INDEX: 39.56 KG/M2

## 2023-10-19 DIAGNOSIS — R73.03 PREDIABETES: ICD-10-CM

## 2023-10-19 DIAGNOSIS — E78.00 HYPERCHOLESTEROLEMIA: ICD-10-CM

## 2023-10-19 DIAGNOSIS — J30.9 CHRONIC ALLERGIC RHINITIS: ICD-10-CM

## 2023-10-19 DIAGNOSIS — I10 ESSENTIAL HYPERTENSION: Primary | ICD-10-CM

## 2023-10-19 PROCEDURE — 3075F SYST BP GE 130 - 139MM HG: CPT | Performed by: FAMILY MEDICINE

## 2023-10-19 PROCEDURE — 3079F DIAST BP 80-89 MM HG: CPT | Performed by: FAMILY MEDICINE

## 2023-10-19 PROCEDURE — 1123F ACP DISCUSS/DSCN MKR DOCD: CPT | Performed by: FAMILY MEDICINE

## 2023-10-19 PROCEDURE — 99214 OFFICE O/P EST MOD 30 MIN: CPT | Performed by: FAMILY MEDICINE

## 2023-10-19 RX ORDER — SIMVASTATIN 40 MG
40 TABLET ORAL NIGHTLY
Qty: 90 TABLET | Refills: 2 | Status: SHIPPED | OUTPATIENT
Start: 2023-10-19

## 2023-10-19 RX ORDER — LOSARTAN POTASSIUM 100 MG/1
100 TABLET ORAL DAILY
Qty: 90 TABLET | Refills: 2 | Status: SHIPPED | OUTPATIENT
Start: 2023-10-19

## 2023-10-19 RX ORDER — FLUTICASONE PROPIONATE 50 MCG
1 SPRAY, SUSPENSION (ML) NASAL DAILY
Qty: 16 G | Refills: 5 | Status: SHIPPED | OUTPATIENT
Start: 2023-10-19

## 2023-10-19 RX ORDER — METOPROLOL SUCCINATE 50 MG/1
50 TABLET, EXTENDED RELEASE ORAL DAILY
Qty: 90 TABLET | Refills: 2 | Status: SHIPPED | OUTPATIENT
Start: 2023-10-19

## 2023-10-19 RX ORDER — HYDROCHLOROTHIAZIDE 25 MG/1
25 TABLET ORAL DAILY
Qty: 90 TABLET | Refills: 2 | Status: SHIPPED | OUTPATIENT
Start: 2023-10-19

## 2023-10-19 ASSESSMENT — PATIENT HEALTH QUESTIONNAIRE - PHQ9
SUM OF ALL RESPONSES TO PHQ QUESTIONS 1-9: 0
1. LITTLE INTEREST OR PLEASURE IN DOING THINGS: 0
SUM OF ALL RESPONSES TO PHQ QUESTIONS 1-9: 0
SUM OF ALL RESPONSES TO PHQ9 QUESTIONS 1 & 2: 0
2. FEELING DOWN, DEPRESSED OR HOPELESS: 0

## 2023-10-19 ASSESSMENT — ENCOUNTER SYMPTOMS
DIARRHEA: 0
CONSTIPATION: 0
COUGH: 0
NAUSEA: 0
WHEEZING: 0
SHORTNESS OF BREATH: 0
BACK PAIN: 0
CHEST TIGHTNESS: 0

## 2023-10-19 NOTE — ASSESSMENT & PLAN NOTE
At goal on recheck. Continue with current medications pending lab results.    Encouraged continue DASH diet and regular physical activity

## 2023-10-19 NOTE — ASSESSMENT & PLAN NOTE
Patient is taking statin without side effect. Discussed benefits of statin in prevention of coronary artery and cerebrovascular disease. Last LDL was 84.

## 2024-04-16 SDOH — ECONOMIC STABILITY: FOOD INSECURITY: WITHIN THE PAST 12 MONTHS, THE FOOD YOU BOUGHT JUST DIDN'T LAST AND YOU DIDN'T HAVE MONEY TO GET MORE.: NEVER TRUE

## 2024-04-16 SDOH — ECONOMIC STABILITY: INCOME INSECURITY: HOW HARD IS IT FOR YOU TO PAY FOR THE VERY BASICS LIKE FOOD, HOUSING, MEDICAL CARE, AND HEATING?: NOT VERY HARD

## 2024-04-16 SDOH — ECONOMIC STABILITY: FOOD INSECURITY: WITHIN THE PAST 12 MONTHS, YOU WORRIED THAT YOUR FOOD WOULD RUN OUT BEFORE YOU GOT MONEY TO BUY MORE.: NEVER TRUE

## 2024-04-16 ASSESSMENT — PATIENT HEALTH QUESTIONNAIRE - PHQ9
SUM OF ALL RESPONSES TO PHQ9 QUESTIONS 1 & 2: 2
1. LITTLE INTEREST OR PLEASURE IN DOING THINGS: SEVERAL DAYS
SUM OF ALL RESPONSES TO PHQ9 QUESTIONS 1 & 2: 2
1. LITTLE INTEREST OR PLEASURE IN DOING THINGS: SEVERAL DAYS
SUM OF ALL RESPONSES TO PHQ QUESTIONS 1-9: 2
SUM OF ALL RESPONSES TO PHQ QUESTIONS 1-9: 2
2. FEELING DOWN, DEPRESSED OR HOPELESS: SEVERAL DAYS
SUM OF ALL RESPONSES TO PHQ QUESTIONS 1-9: 2
2. FEELING DOWN, DEPRESSED OR HOPELESS: SEVERAL DAYS
SUM OF ALL RESPONSES TO PHQ QUESTIONS 1-9: 2

## 2024-04-19 ENCOUNTER — OFFICE VISIT (OUTPATIENT)
Dept: FAMILY MEDICINE CLINIC | Facility: CLINIC | Age: 79
End: 2024-04-19
Payer: MEDICARE

## 2024-04-19 VITALS
OXYGEN SATURATION: 97 % | HEART RATE: 64 BPM | SYSTOLIC BLOOD PRESSURE: 128 MMHG | HEIGHT: 68 IN | WEIGHT: 266.8 LBS | BODY MASS INDEX: 40.44 KG/M2 | DIASTOLIC BLOOD PRESSURE: 60 MMHG

## 2024-04-19 DIAGNOSIS — I10 ESSENTIAL HYPERTENSION: Primary | ICD-10-CM

## 2024-04-19 DIAGNOSIS — I10 ESSENTIAL HYPERTENSION: ICD-10-CM

## 2024-04-19 DIAGNOSIS — E66.01 SEVERE OBESITY (HCC): ICD-10-CM

## 2024-04-19 DIAGNOSIS — R73.03 PREDIABETES: ICD-10-CM

## 2024-04-19 DIAGNOSIS — J30.9 CHRONIC ALLERGIC RHINITIS: ICD-10-CM

## 2024-04-19 DIAGNOSIS — E78.00 HYPERCHOLESTEROLEMIA: ICD-10-CM

## 2024-04-19 LAB
ALBUMIN SERPL-MCNC: 3.6 G/DL (ref 3.2–4.6)
ALBUMIN/GLOB SERPL: 1.1 (ref 0.4–1.6)
ALP SERPL-CCNC: 66 U/L (ref 50–136)
ALT SERPL-CCNC: 15 U/L (ref 12–65)
ANION GAP SERPL CALC-SCNC: 5 MMOL/L (ref 2–11)
AST SERPL-CCNC: 18 U/L (ref 15–37)
BASOPHILS # BLD: 0.1 K/UL (ref 0–0.2)
BASOPHILS NFR BLD: 1 % (ref 0–2)
BILIRUB SERPL-MCNC: 0.8 MG/DL (ref 0.2–1.1)
BUN SERPL-MCNC: 15 MG/DL (ref 8–23)
CALCIUM SERPL-MCNC: 9.3 MG/DL (ref 8.3–10.4)
CHLORIDE SERPL-SCNC: 107 MMOL/L (ref 103–113)
CHOLEST SERPL-MCNC: 156 MG/DL
CO2 SERPL-SCNC: 29 MMOL/L (ref 21–32)
CREAT SERPL-MCNC: 0.8 MG/DL (ref 0.8–1.5)
DIFFERENTIAL METHOD BLD: ABNORMAL
EOSINOPHIL # BLD: 0.1 K/UL (ref 0–0.8)
EOSINOPHIL NFR BLD: 2 % (ref 0.5–7.8)
ERYTHROCYTE [DISTWIDTH] IN BLOOD BY AUTOMATED COUNT: 14.3 % (ref 11.9–14.6)
GLOBULIN SER CALC-MCNC: 3.2 G/DL (ref 2.8–4.5)
GLUCOSE SERPL-MCNC: 104 MG/DL (ref 65–100)
HBA1C MFR BLD: 6.1 %
HCT VFR BLD AUTO: 43.9 % (ref 41.1–50.3)
HDLC SERPL-MCNC: 40 MG/DL (ref 40–60)
HDLC SERPL: 3.9
HGB BLD-MCNC: 13.4 G/DL (ref 13.6–17.2)
IMM GRANULOCYTES # BLD AUTO: 0 K/UL (ref 0–0.5)
IMM GRANULOCYTES NFR BLD AUTO: 0 % (ref 0–5)
LDLC SERPL CALC-MCNC: 92.8 MG/DL
LYMPHOCYTES # BLD: 1.4 K/UL (ref 0.5–4.6)
LYMPHOCYTES NFR BLD: 18 % (ref 13–44)
MCH RBC QN AUTO: 28 PG (ref 26.1–32.9)
MCHC RBC AUTO-ENTMCNC: 30.5 G/DL (ref 31.4–35)
MCV RBC AUTO: 91.8 FL (ref 82–102)
MONOCYTES # BLD: 0.5 K/UL (ref 0.1–1.3)
MONOCYTES NFR BLD: 7 % (ref 4–12)
NEUTS SEG # BLD: 5.4 K/UL (ref 1.7–8.2)
NEUTS SEG NFR BLD: 72 % (ref 43–78)
NRBC # BLD: 0 K/UL (ref 0–0.2)
PLATELET # BLD AUTO: 250 K/UL (ref 150–450)
PMV BLD AUTO: 11.2 FL (ref 9.4–12.3)
POTASSIUM SERPL-SCNC: 4 MMOL/L (ref 3.5–5.1)
PROT SERPL-MCNC: 6.8 G/DL (ref 6.3–8.2)
RBC # BLD AUTO: 4.78 M/UL (ref 4.23–5.6)
SODIUM SERPL-SCNC: 141 MMOL/L (ref 136–146)
TRIGL SERPL-MCNC: 116 MG/DL (ref 35–150)
TSH W FREE THYROID IF ABNORMAL: 1.48 UIU/ML (ref 0.36–3.74)
VLDLC SERPL CALC-MCNC: 23.2 MG/DL (ref 6–23)
WBC # BLD AUTO: 7.5 K/UL (ref 4.3–11.1)

## 2024-04-19 PROCEDURE — 3078F DIAST BP <80 MM HG: CPT | Performed by: FAMILY MEDICINE

## 2024-04-19 PROCEDURE — 3074F SYST BP LT 130 MM HG: CPT | Performed by: FAMILY MEDICINE

## 2024-04-19 PROCEDURE — 1123F ACP DISCUSS/DSCN MKR DOCD: CPT | Performed by: FAMILY MEDICINE

## 2024-04-19 PROCEDURE — 83036 HEMOGLOBIN GLYCOSYLATED A1C: CPT | Performed by: FAMILY MEDICINE

## 2024-04-19 PROCEDURE — 99214 OFFICE O/P EST MOD 30 MIN: CPT | Performed by: FAMILY MEDICINE

## 2024-04-19 RX ORDER — SIMVASTATIN 40 MG
40 TABLET ORAL NIGHTLY
Qty: 90 TABLET | Refills: 2 | Status: SHIPPED | OUTPATIENT
Start: 2024-04-19

## 2024-04-19 RX ORDER — HYDROCHLOROTHIAZIDE 25 MG/1
25 TABLET ORAL DAILY
Qty: 90 TABLET | Refills: 2 | Status: SHIPPED | OUTPATIENT
Start: 2024-04-19

## 2024-04-19 RX ORDER — LOSARTAN POTASSIUM 100 MG/1
100 TABLET ORAL DAILY
Qty: 90 TABLET | Refills: 2 | Status: SHIPPED | OUTPATIENT
Start: 2024-04-19

## 2024-04-19 RX ORDER — METOPROLOL SUCCINATE 50 MG/1
50 TABLET, EXTENDED RELEASE ORAL DAILY
Qty: 90 TABLET | Refills: 2 | Status: SHIPPED | OUTPATIENT
Start: 2024-04-19

## 2024-04-19 RX ORDER — FLUTICASONE PROPIONATE 50 MCG
1 SPRAY, SUSPENSION (ML) NASAL DAILY
Qty: 16 G | Refills: 5 | Status: SHIPPED | OUTPATIENT
Start: 2024-04-19

## 2024-04-19 ASSESSMENT — ENCOUNTER SYMPTOMS
CONSTIPATION: 0
BACK PAIN: 0
SHORTNESS OF BREATH: 0
NAUSEA: 0
CHEST TIGHTNESS: 0
DIARRHEA: 0
COUGH: 0
WHEEZING: 0

## 2024-04-19 NOTE — PROGRESS NOTES
Jeff Bearden is a 79 y.o. male who presents today for the following:  Chief Complaint   Patient presents with    Hypertension     6 month follow up        No Known Allergies    Current Outpatient Medications   Medication Sig Dispense Refill    fluticasone (FLONASE) 50 MCG/ACT nasal spray 1 spray by Nasal route daily 16 g 5    hydroCHLOROthiazide (HYDRODIURIL) 25 MG tablet Take 1 tablet by mouth daily 90 tablet 2    losartan (COZAAR) 100 MG tablet Take 1 tablet by mouth daily 90 tablet 2    metoprolol succinate (TOPROL XL) 50 MG extended release tablet Take 1 tablet by mouth daily 90 tablet 2    simvastatin (ZOCOR) 40 MG tablet Take 1 tablet by mouth nightly 90 tablet 2    bimatoprost (LUMIGAN) 0.03 % ophthalmic drops       dorzolamide-timolol (COSOPT) 22.3-6.8 MG/ML ophthalmic solution       diclofenac sodium (VOLTAREN) 1 % GEL APPLY 2GRAMS TO AFFECTED AREA 2 TIMES A DAY AS NEEDED       No current facility-administered medications for this visit.       Past Medical History:   Diagnosis Date    Arthritis     Cancer (HCC)     Hx of skin cancer    Chronic pain     GERD (gastroesophageal reflux disease)     Headache     Hypercholesterolemia     Hypertension        Past Surgical History:   Procedure Laterality Date    TONSILLECTOMY         Social History     Tobacco Use    Smoking status: Never    Smokeless tobacco: Never   Substance Use Topics    Alcohol use: Yes        Family History   Problem Relation Age of Onset    Cancer Mother         bile duct    Heart Attack Father     Cancer Maternal Grandfather     Cancer Paternal Grandfather        Patient is a 79-year-old gentleman here today for routine follow-up.  Patient has a history of high blood pressure, osteoarthritis, hyperlipidemia, prediabetes, and obesity. At last visit BP readings high at home.          Review of Systems   Constitutional:  Negative for appetite change, fatigue, fever and unexpected weight change.   Respiratory:  Negative for cough, chest

## 2024-04-19 NOTE — ASSESSMENT & PLAN NOTE
At goal.  Continue with current medications pending lab results.   Encouraged continue DASH diet and regular physical activity.  Some dyspnea on exertion with walking up hills.  Discussed referral to cardiology but patient declines at this time.

## 2024-05-13 ENCOUNTER — INITIAL CONSULT (OUTPATIENT)
Age: 79
End: 2024-05-13
Payer: MEDICARE

## 2024-05-13 VITALS
HEIGHT: 68 IN | WEIGHT: 270.8 LBS | BODY MASS INDEX: 41.04 KG/M2 | SYSTOLIC BLOOD PRESSURE: 154 MMHG | DIASTOLIC BLOOD PRESSURE: 100 MMHG | HEART RATE: 76 BPM

## 2024-05-13 DIAGNOSIS — E78.00 HYPERCHOLESTEROLEMIA: Chronic | ICD-10-CM

## 2024-05-13 DIAGNOSIS — R06.09 DYSPNEA ON EXERTION: Primary | ICD-10-CM

## 2024-05-13 DIAGNOSIS — I10 ESSENTIAL HYPERTENSION: Chronic | ICD-10-CM

## 2024-05-13 PROCEDURE — 93000 ELECTROCARDIOGRAM COMPLETE: CPT | Performed by: INTERNAL MEDICINE

## 2024-05-13 PROCEDURE — 3080F DIAST BP >= 90 MM HG: CPT | Performed by: INTERNAL MEDICINE

## 2024-05-13 PROCEDURE — 3075F SYST BP GE 130 - 139MM HG: CPT | Performed by: INTERNAL MEDICINE

## 2024-05-13 PROCEDURE — 99204 OFFICE O/P NEW MOD 45 MIN: CPT | Performed by: INTERNAL MEDICINE

## 2024-05-13 NOTE — PROGRESS NOTES
[facility], [address] [place] [address]  PHONE: [phone]    SUBJECTIVE:   Jeff Bearden is a 79 y.o. male 1945   seen for a consultation visit regarding the following:     Chief Complaint   Patient presents with    Consultation     SOB              Consultation is requested for evaluation of Consultation (SOB)   .    History of present illness: 79 y.o. male with PMH HTN, HLD presenting for evaluation of shortness of breath. Patient has JACK when walking around the neighborhood. Patient reports pain in his left chest which he attributes to muscular pain when using his left arm. No other acute complaints.      Past Medical History, Past Surgical History, Family history, Social History, and Medications were all reviewed with the patient today and updated as necessary.       No Known Allergies  Past Medical History:   Diagnosis Date    Arthritis     Cancer (HCC)     Hx of skin cancer    Chronic pain     GERD (gastroesophageal reflux disease)     Headache     Hypercholesterolemia     Hypertension      Past Surgical History:   Procedure Laterality Date    TONSILLECTOMY       Family History   Problem Relation Age of Onset    Cancer Mother         bile duct    Heart Attack Father     Cancer Maternal Grandfather     Cancer Paternal Grandfather      Social History     Tobacco Use    Smoking status: Never    Smokeless tobacco: Never   Substance Use Topics    Alcohol use: Yes       ROS:    Review of Systems   Cardiovascular:  Positive for chest pain and dyspnea on exertion. Negative for palpitations and syncope.   Neurological:  Negative for light-headedness.          PHYSICAL EXAM:   BP (!) 154/100   Pulse 76   Ht 1.727 m (5' 8\")   Wt 122.8 kg (270 lb 12.8 oz)   BMI 41.17 kg/m²      Physical Exam  Constitutional:       General: He is not in acute distress.     Appearance: Normal appearance.   HENT:      Head: Normocephalic and atraumatic.      Mouth/Throat:      Mouth: Mucous membranes are moist.

## 2024-07-01 ENCOUNTER — TELEPHONE (OUTPATIENT)
Age: 79
End: 2024-07-01

## 2024-07-01 NOTE — TELEPHONE ENCOUNTER
----- Message from Carroll Jordan, DO sent at 6/29/2024  9:32 AM EDT -----  Please let patient know his echo showed some thickening of the heart muscle wall and otherwise appears normal. This may be related to high blood pressure so I would continue to work with your PCP to control this. Please let me know if he has any further questions or concerns. Thank you.

## 2024-10-18 ENCOUNTER — OFFICE VISIT (OUTPATIENT)
Dept: FAMILY MEDICINE CLINIC | Facility: CLINIC | Age: 79
End: 2024-10-18

## 2024-10-18 VITALS
OXYGEN SATURATION: 95 % | WEIGHT: 267.8 LBS | DIASTOLIC BLOOD PRESSURE: 84 MMHG | BODY MASS INDEX: 40.72 KG/M2 | HEART RATE: 61 BPM | SYSTOLIC BLOOD PRESSURE: 126 MMHG

## 2024-10-18 DIAGNOSIS — R73.03 PREDIABETES: ICD-10-CM

## 2024-10-18 DIAGNOSIS — I10 ESSENTIAL HYPERTENSION: ICD-10-CM

## 2024-10-18 DIAGNOSIS — E78.00 HYPERCHOLESTEROLEMIA: ICD-10-CM

## 2024-10-18 DIAGNOSIS — J30.9 CHRONIC ALLERGIC RHINITIS: ICD-10-CM

## 2024-10-18 DIAGNOSIS — H61.21 RIGHT EAR IMPACTED CERUMEN: ICD-10-CM

## 2024-10-18 DIAGNOSIS — J06.9 UPPER RESPIRATORY INFECTION, ACUTE: ICD-10-CM

## 2024-10-18 DIAGNOSIS — Z00.00 MEDICARE ANNUAL WELLNESS VISIT, SUBSEQUENT: Primary | ICD-10-CM

## 2024-10-18 LAB
ALBUMIN SERPL-MCNC: 3.5 G/DL (ref 3.2–4.6)
ALBUMIN/GLOB SERPL: 1.2 (ref 1–1.9)
ALP SERPL-CCNC: 66 U/L (ref 40–129)
ALT SERPL-CCNC: 9 U/L (ref 8–55)
ANION GAP SERPL CALC-SCNC: 9 MMOL/L (ref 9–18)
AST SERPL-CCNC: 20 U/L (ref 15–37)
BASOPHILS # BLD: 0.1 K/UL (ref 0–0.2)
BASOPHILS NFR BLD: 1 % (ref 0–2)
BILIRUB SERPL-MCNC: 0.5 MG/DL (ref 0–1.2)
BUN SERPL-MCNC: 15 MG/DL (ref 8–23)
CALCIUM SERPL-MCNC: 9.7 MG/DL (ref 8.8–10.2)
CHLORIDE SERPL-SCNC: 105 MMOL/L (ref 98–107)
CO2 SERPL-SCNC: 30 MMOL/L (ref 20–28)
CREAT SERPL-MCNC: 0.84 MG/DL (ref 0.8–1.3)
DIFFERENTIAL METHOD BLD: ABNORMAL
EOSINOPHIL # BLD: 0.2 K/UL (ref 0–0.8)
EOSINOPHIL NFR BLD: 2 % (ref 0.5–7.8)
ERYTHROCYTE [DISTWIDTH] IN BLOOD BY AUTOMATED COUNT: 13.8 % (ref 11.9–14.6)
GLOBULIN SER CALC-MCNC: 3 G/DL (ref 2.3–3.5)
GLUCOSE SERPL-MCNC: 119 MG/DL (ref 70–99)
HBA1C MFR BLD: 6.1 %
HCT VFR BLD AUTO: 44.4 % (ref 41.1–50.3)
HGB BLD-MCNC: 13.6 G/DL (ref 13.6–17.2)
IMM GRANULOCYTES # BLD AUTO: 0 K/UL (ref 0–0.5)
IMM GRANULOCYTES NFR BLD AUTO: 0 % (ref 0–5)
LYMPHOCYTES # BLD: 1.2 K/UL (ref 0.5–4.6)
LYMPHOCYTES NFR BLD: 15 % (ref 13–44)
MCH RBC QN AUTO: 28.6 PG (ref 26.1–32.9)
MCHC RBC AUTO-ENTMCNC: 30.6 G/DL (ref 31.4–35)
MCV RBC AUTO: 93.3 FL (ref 82–102)
MONOCYTES # BLD: 0.5 K/UL (ref 0.1–1.3)
MONOCYTES NFR BLD: 7 % (ref 4–12)
NEUTS SEG # BLD: 5.8 K/UL (ref 1.7–8.2)
NEUTS SEG NFR BLD: 75 % (ref 43–78)
NRBC # BLD: 0 K/UL (ref 0–0.2)
PLATELET # BLD AUTO: 269 K/UL (ref 150–450)
PMV BLD AUTO: 11.6 FL (ref 9.4–12.3)
POTASSIUM SERPL-SCNC: 4.5 MMOL/L (ref 3.5–5.1)
PROT SERPL-MCNC: 6.5 G/DL (ref 6.3–8.2)
RBC # BLD AUTO: 4.76 M/UL (ref 4.23–5.6)
SODIUM SERPL-SCNC: 143 MMOL/L (ref 136–145)
WBC # BLD AUTO: 7.7 K/UL (ref 4.3–11.1)

## 2024-10-18 RX ORDER — LOSARTAN POTASSIUM 100 MG/1
100 TABLET ORAL DAILY
Qty: 90 TABLET | Refills: 2 | Status: SHIPPED | OUTPATIENT
Start: 2024-10-18

## 2024-10-18 RX ORDER — LATANOPROST 50 UG/ML
SOLUTION/ DROPS OPHTHALMIC
COMMUNITY
Start: 2024-10-02

## 2024-10-18 RX ORDER — AZITHROMYCIN 250 MG/1
TABLET, FILM COATED ORAL
Qty: 6 TABLET | Refills: 0 | Status: SHIPPED | OUTPATIENT
Start: 2024-10-18 | End: 2024-10-28

## 2024-10-18 RX ORDER — METOPROLOL SUCCINATE 50 MG/1
50 TABLET, EXTENDED RELEASE ORAL DAILY
Qty: 90 TABLET | Refills: 2 | Status: SHIPPED | OUTPATIENT
Start: 2024-10-18

## 2024-10-18 RX ORDER — FLUTICASONE PROPIONATE 50 MCG
1 SPRAY, SUSPENSION (ML) NASAL DAILY
Qty: 16 G | Refills: 5 | Status: SHIPPED | OUTPATIENT
Start: 2024-10-18

## 2024-10-18 RX ORDER — HYDROCHLOROTHIAZIDE 25 MG/1
25 TABLET ORAL DAILY
Qty: 90 TABLET | Refills: 2 | Status: SHIPPED | OUTPATIENT
Start: 2024-10-18

## 2024-10-18 RX ORDER — SIMVASTATIN 40 MG
40 TABLET ORAL NIGHTLY
Qty: 90 TABLET | Refills: 2 | Status: SHIPPED | OUTPATIENT
Start: 2024-10-18

## 2024-10-18 ASSESSMENT — PATIENT HEALTH QUESTIONNAIRE - PHQ9
SUM OF ALL RESPONSES TO PHQ9 QUESTIONS 1 & 2: 2
2. FEELING DOWN, DEPRESSED OR HOPELESS: SEVERAL DAYS
SUM OF ALL RESPONSES TO PHQ QUESTIONS 1-9: 2
1. LITTLE INTEREST OR PLEASURE IN DOING THINGS: SEVERAL DAYS
SUM OF ALL RESPONSES TO PHQ QUESTIONS 1-9: 2

## 2024-10-18 ASSESSMENT — ENCOUNTER SYMPTOMS
SINUS PRESSURE: 1
SHORTNESS OF BREATH: 0
CONSTIPATION: 0
BACK PAIN: 0
COUGH: 0
WHEEZING: 0
DIARRHEA: 0
RHINORRHEA: 1
NAUSEA: 0
CHEST TIGHTNESS: 0

## 2024-10-18 ASSESSMENT — LIFESTYLE VARIABLES
HOW MANY STANDARD DRINKS CONTAINING ALCOHOL DO YOU HAVE ON A TYPICAL DAY: PATIENT DECLINED
HOW OFTEN DO YOU HAVE A DRINK CONTAINING ALCOHOL: MONTHLY OR LESS

## 2024-10-18 NOTE — ASSESSMENT & PLAN NOTE
At goal.  Continue with current medications pending lab results.   Encouraged continue DASH diet and regular physical activity.  Some dyspnea on exertion with walking up hills.  ECHO showed increased wall thickness but otherwise normal

## 2024-10-18 NOTE — PROGRESS NOTES
chest tightness, shortness of breath and wheezing.    Cardiovascular:  Negative for chest pain and palpitations.   Gastrointestinal:  Negative for constipation, diarrhea and nausea.   Musculoskeletal:  Negative for back pain.   Skin:  Negative for rash.   Neurological:  Negative for dizziness and light-headedness.   Psychiatric/Behavioral:  Negative for confusion. The patient is not nervous/anxious.           /84   Pulse 61   Wt 121.5 kg (267 lb 12.8 oz)   SpO2 95%   BMI 40.72 kg/m²     Physical Exam  Vitals reviewed.   Constitutional:       General: He is not in acute distress.     Appearance: Normal appearance. He is obese. He is not ill-appearing.   HENT:      Right Ear: Tympanic membrane normal. There is impacted cerumen.      Left Ear: Tympanic membrane normal.   Eyes:      General: No scleral icterus.     Conjunctiva/sclera: Conjunctivae normal.   Cardiovascular:      Rate and Rhythm: Normal rate and regular rhythm.      Heart sounds: Normal heart sounds. No murmur heard.  Pulmonary:      Effort: Pulmonary effort is normal. No respiratory distress.      Breath sounds: Normal breath sounds. No wheezing.   Musculoskeletal:      Cervical back: Neck supple.      Right lower leg: No edema.      Left lower leg: No edema.   Skin:     Findings: No rash.   Neurological:      General: No focal deficit present.      Mental Status: He is alert and oriented to person, place, and time.   Psychiatric:         Behavior: Behavior normal.         Thought Content: Thought content normal.          Hemoglobin A1C, POC   Date Value Ref Range Status   10/18/2024 6.1 % Final   04/19/2024 6.1 % Final   04/18/2023 6.2 % Final          1. Essential hypertension  Assessment & Plan:  At goal.  Continue with current medications pending lab results.   Encouraged continue DASH diet and regular physical activity.  Some dyspnea on exertion with walking up hills.  ECHO showed increased wall thickness but otherwise normal  Orders:  -

## 2024-10-18 NOTE — ASSESSMENT & PLAN NOTE
Patient is taking statin without side effect.  Discussed benefits of statin in prevention of coronary artery and cerebrovascular disease.  Last LDL was 92

## 2025-04-15 SDOH — ECONOMIC STABILITY: INCOME INSECURITY: IN THE LAST 12 MONTHS, WAS THERE A TIME WHEN YOU WERE NOT ABLE TO PAY THE MORTGAGE OR RENT ON TIME?: NO

## 2025-04-15 SDOH — ECONOMIC STABILITY: FOOD INSECURITY: WITHIN THE PAST 12 MONTHS, THE FOOD YOU BOUGHT JUST DIDN'T LAST AND YOU DIDN'T HAVE MONEY TO GET MORE.: NEVER TRUE

## 2025-04-15 SDOH — ECONOMIC STABILITY: FOOD INSECURITY: WITHIN THE PAST 12 MONTHS, YOU WORRIED THAT YOUR FOOD WOULD RUN OUT BEFORE YOU GOT MONEY TO BUY MORE.: NEVER TRUE

## 2025-04-15 SDOH — ECONOMIC STABILITY: TRANSPORTATION INSECURITY
IN THE PAST 12 MONTHS, HAS THE LACK OF TRANSPORTATION KEPT YOU FROM MEDICAL APPOINTMENTS OR FROM GETTING MEDICATIONS?: NO

## 2025-04-15 ASSESSMENT — PATIENT HEALTH QUESTIONNAIRE - PHQ9
SUM OF ALL RESPONSES TO PHQ QUESTIONS 1-9: 2
2. FEELING DOWN, DEPRESSED OR HOPELESS: SEVERAL DAYS
SUM OF ALL RESPONSES TO PHQ QUESTIONS 1-9: 2
1. LITTLE INTEREST OR PLEASURE IN DOING THINGS: SEVERAL DAYS
SUM OF ALL RESPONSES TO PHQ QUESTIONS 1-9: 2
2. FEELING DOWN, DEPRESSED OR HOPELESS: SEVERAL DAYS
SUM OF ALL RESPONSES TO PHQ QUESTIONS 1-9: 2
1. LITTLE INTEREST OR PLEASURE IN DOING THINGS: SEVERAL DAYS
SUM OF ALL RESPONSES TO PHQ9 QUESTIONS 1 & 2: 2

## 2025-04-17 NOTE — PROGRESS NOTES
Presents with numerous dry, rough patches on the skin.  - Referral to [facility] for further evaluation and management.    6. Nocturia.  - Reports getting up once a night to urinate, occasionally up to four times.  - Advised to limit fluid intake before bedtime and avoid consuming sugary foods prior to sleep.  - No changes in urination or bowel movements reported.  - Advised to continue monitoring fluid intake and dietary habits.    Follow-up  - Dermatology clinic will contact to schedule an appointment within the next week.      Diagnoses and orders for visit:  1. Essential hypertension  Assessment & Plan:  At goal.  Continue with current medications pending lab results.   Encouraged continue DASH diet and regular physical activity.  Some dyspnea on exertion with walking up hills.  ECHO showed increased wall thickness but otherwise normal  Orders:  -     CBC with Auto Differential; Future  -     Comprehensive Metabolic Panel; Future  -     Lipid Panel; Future  -     TSH reflex to FT4; Future  2. Prediabetes  Assessment & Plan:   Continues to be prediabetes range.  No changes made today.  Orders:  -     AMB POC HEMOGLOBIN A1C  3. Hypercholesterolemia  Assessment & Plan:  Patient is taking statin without side effect.  Discussed benefits of statin in prevention of coronary artery and cerebrovascular disease.  Last LDL was 92  4. Severe obesity  Assessment & Plan:   Continue with lifestyle changes.  5. Actinic keratosis  -     [facility] - [facility]            The patient (or guardian, if applicable) and other individuals in attendance with the patient were advised that Artificial Intelligence will be utilized during this visit to record, process the conversation to generate a clinical note, and support improvement of the AI technology. The patient (or guardian, if applicable) and other individuals in attendance at the appointment consented to the use of AI, including the recording.          Patient

## 2025-04-18 ENCOUNTER — OFFICE VISIT (OUTPATIENT)
Dept: FAMILY MEDICINE CLINIC | Facility: CLINIC | Age: 80
End: 2025-04-18
Payer: MEDICARE

## 2025-04-18 VITALS
DIASTOLIC BLOOD PRESSURE: 80 MMHG | WEIGHT: 278 LBS | HEART RATE: 61 BPM | BODY MASS INDEX: 42.13 KG/M2 | SYSTOLIC BLOOD PRESSURE: 120 MMHG | HEIGHT: 68 IN | OXYGEN SATURATION: 96 %

## 2025-04-18 DIAGNOSIS — L57.0 ACTINIC KERATOSIS: ICD-10-CM

## 2025-04-18 DIAGNOSIS — E78.00 HYPERCHOLESTEROLEMIA: ICD-10-CM

## 2025-04-18 DIAGNOSIS — E66.01 SEVERE OBESITY: ICD-10-CM

## 2025-04-18 DIAGNOSIS — I10 ESSENTIAL HYPERTENSION: Primary | ICD-10-CM

## 2025-04-18 DIAGNOSIS — R73.03 PREDIABETES: ICD-10-CM

## 2025-04-18 DIAGNOSIS — I10 ESSENTIAL HYPERTENSION: ICD-10-CM

## 2025-04-18 LAB
ALBUMIN SERPL-MCNC: 3.5 G/DL (ref 3.2–4.6)
ALBUMIN/GLOB SERPL: 1.1 (ref 1–1.9)
ALP SERPL-CCNC: 75 U/L (ref 40–129)
ALT SERPL-CCNC: 10 U/L (ref 8–55)
ANION GAP SERPL CALC-SCNC: 10 MMOL/L (ref 7–16)
AST SERPL-CCNC: 26 U/L (ref 15–37)
BASOPHILS # BLD: 0.06 K/UL (ref 0–0.2)
BASOPHILS NFR BLD: 0.9 % (ref 0–2)
BILIRUB SERPL-MCNC: 0.7 MG/DL (ref 0–1.2)
BUN SERPL-MCNC: 14 MG/DL (ref 8–23)
CALCIUM SERPL-MCNC: 9.5 MG/DL (ref 8.8–10.2)
CHLORIDE SERPL-SCNC: 101 MMOL/L (ref 98–107)
CHOLEST SERPL-MCNC: 147 MG/DL (ref 0–200)
CO2 SERPL-SCNC: 30 MMOL/L (ref 20–29)
CREAT SERPL-MCNC: 0.82 MG/DL (ref 0.8–1.3)
DIFFERENTIAL METHOD BLD: NORMAL
EOSINOPHIL # BLD: 0.23 K/UL (ref 0–0.8)
EOSINOPHIL NFR BLD: 3.3 % (ref 0.5–7.8)
ERYTHROCYTE [DISTWIDTH] IN BLOOD BY AUTOMATED COUNT: 14 % (ref 11.9–14.6)
GLOBULIN SER CALC-MCNC: 3.2 G/DL (ref 2.3–3.5)
GLUCOSE SERPL-MCNC: 112 MG/DL (ref 70–99)
HBA1C MFR BLD: 6.4 %
HCT VFR BLD AUTO: 42 % (ref 41.1–50.3)
HDLC SERPL-MCNC: 33 MG/DL (ref 40–60)
HDLC SERPL: 4.4 (ref 0–5)
HGB BLD-MCNC: 13.6 G/DL (ref 13.6–17.2)
IMM GRANULOCYTES # BLD AUTO: 0.01 K/UL (ref 0–0.5)
IMM GRANULOCYTES NFR BLD AUTO: 0.1 % (ref 0–5)
LDLC SERPL CALC-MCNC: 90 MG/DL (ref 0–100)
LYMPHOCYTES # BLD: 1.36 K/UL (ref 0.5–4.6)
LYMPHOCYTES NFR BLD: 19.3 % (ref 13–44)
MCH RBC QN AUTO: 28.6 PG (ref 26.1–32.9)
MCHC RBC AUTO-ENTMCNC: 32.4 G/DL (ref 31.4–35)
MCV RBC AUTO: 88.4 FL (ref 82–102)
MONOCYTES # BLD: 0.55 K/UL (ref 0.1–1.3)
MONOCYTES NFR BLD: 7.8 % (ref 4–12)
NEUTS SEG # BLD: 4.83 K/UL (ref 1.7–8.2)
NEUTS SEG NFR BLD: 68.6 % (ref 43–78)
NRBC # BLD: 0 K/UL (ref 0–0.2)
PLATELET # BLD AUTO: 241 K/UL (ref 150–450)
PMV BLD AUTO: 11.5 FL (ref 9.4–12.3)
POTASSIUM SERPL-SCNC: 4.3 MMOL/L (ref 3.5–5.1)
PROT SERPL-MCNC: 6.6 G/DL (ref 6.3–8.2)
RBC # BLD AUTO: 4.75 M/UL (ref 4.23–5.6)
SODIUM SERPL-SCNC: 141 MMOL/L (ref 136–145)
TRIGL SERPL-MCNC: 121 MG/DL (ref 0–150)
TSH W FREE THYROID IF ABNORMAL: 1.63 UIU/ML (ref 0.27–4.2)
VLDLC SERPL CALC-MCNC: 24 MG/DL (ref 6–23)
WBC # BLD AUTO: 7 K/UL (ref 4.3–11.1)

## 2025-04-18 PROCEDURE — 1123F ACP DISCUSS/DSCN MKR DOCD: CPT | Performed by: FAMILY MEDICINE

## 2025-04-18 PROCEDURE — 3079F DIAST BP 80-89 MM HG: CPT | Performed by: FAMILY MEDICINE

## 2025-04-18 PROCEDURE — 3074F SYST BP LT 130 MM HG: CPT | Performed by: FAMILY MEDICINE

## 2025-04-18 PROCEDURE — G2211 COMPLEX E/M VISIT ADD ON: HCPCS | Performed by: FAMILY MEDICINE

## 2025-04-18 PROCEDURE — 83036 HEMOGLOBIN GLYCOSYLATED A1C: CPT | Performed by: FAMILY MEDICINE

## 2025-04-18 PROCEDURE — 99214 OFFICE O/P EST MOD 30 MIN: CPT | Performed by: FAMILY MEDICINE

## 2025-04-21 ENCOUNTER — RESULTS FOLLOW-UP (OUTPATIENT)
Dept: FAMILY MEDICINE CLINIC | Facility: CLINIC | Age: 80
End: 2025-04-21